# Patient Record
Sex: FEMALE | Race: OTHER | HISPANIC OR LATINO
[De-identification: names, ages, dates, MRNs, and addresses within clinical notes are randomized per-mention and may not be internally consistent; named-entity substitution may affect disease eponyms.]

---

## 2017-01-30 ENCOUNTER — RECORD ABSTRACTING (OUTPATIENT)
Age: 57
End: 2017-01-30

## 2017-01-30 DIAGNOSIS — Z87.19 PERSONAL HISTORY OF OTHER DISEASES OF THE DIGESTIVE SYSTEM: ICD-10-CM

## 2017-01-30 DIAGNOSIS — Z87.898 PERSONAL HISTORY OF OTHER SPECIFIED CONDITIONS: ICD-10-CM

## 2017-01-30 DIAGNOSIS — Z80.3 FAMILY HISTORY OF MALIGNANT NEOPLASM OF BREAST: ICD-10-CM

## 2017-01-30 DIAGNOSIS — Z86.010 PERSONAL HISTORY OF COLONIC POLYPS: ICD-10-CM

## 2017-01-30 DIAGNOSIS — Z78.9 OTHER SPECIFIED HEALTH STATUS: ICD-10-CM

## 2017-01-30 DIAGNOSIS — Z80.49 FAMILY HISTORY OF MALIGNANT NEOPLASM OF OTHER GENITAL ORGANS: ICD-10-CM

## 2017-01-30 DIAGNOSIS — Z86.39 PERSONAL HISTORY OF OTHER ENDOCRINE, NUTRITIONAL AND METABOLIC DISEASE: ICD-10-CM

## 2017-02-17 ENCOUNTER — OUTPATIENT (OUTPATIENT)
Dept: OUTPATIENT SERVICES | Facility: HOSPITAL | Age: 57
LOS: 1 days | Discharge: HOME | End: 2017-02-17

## 2017-03-09 ENCOUNTER — APPOINTMENT (OUTPATIENT)
Dept: PODIATRY | Facility: CLINIC | Age: 57
End: 2017-03-09

## 2017-05-01 ENCOUNTER — APPOINTMENT (OUTPATIENT)
Dept: OBGYN | Facility: CLINIC | Age: 57
End: 2017-05-01

## 2017-06-15 ENCOUNTER — APPOINTMENT (OUTPATIENT)
Dept: OBGYN | Facility: CLINIC | Age: 57
End: 2017-06-15

## 2017-06-27 DIAGNOSIS — Z00.00 ENCOUNTER FOR GENERAL ADULT MEDICAL EXAMINATION WITHOUT ABNORMAL FINDINGS: ICD-10-CM

## 2017-06-27 DIAGNOSIS — E78.00 PURE HYPERCHOLESTEROLEMIA, UNSPECIFIED: ICD-10-CM

## 2017-07-17 ENCOUNTER — APPOINTMENT (OUTPATIENT)
Dept: PODIATRY | Facility: CLINIC | Age: 57
End: 2017-07-17

## 2017-09-19 ENCOUNTER — APPOINTMENT (OUTPATIENT)
Dept: OBGYN | Facility: CLINIC | Age: 57
End: 2017-09-19

## 2018-01-30 ENCOUNTER — OUTPATIENT (OUTPATIENT)
Dept: OUTPATIENT SERVICES | Facility: HOSPITAL | Age: 58
LOS: 1 days | Discharge: HOME | End: 2018-01-30

## 2018-02-05 DIAGNOSIS — H51.11 CONVERGENCE INSUFFICIENCY: ICD-10-CM

## 2018-02-05 DIAGNOSIS — H25.13 AGE-RELATED NUCLEAR CATARACT, BILATERAL: ICD-10-CM

## 2018-02-05 DIAGNOSIS — H16.223 KERATOCONJUNCTIVITIS SICCA, NOT SPECIFIED AS SJOGREN'S, BILATERAL: ICD-10-CM

## 2018-02-05 DIAGNOSIS — H35.61 RETINAL HEMORRHAGE, RIGHT EYE: ICD-10-CM

## 2018-02-05 DIAGNOSIS — H35.013 CHANGES IN RETINAL VASCULAR APPEARANCE, BILATERAL: ICD-10-CM

## 2018-03-15 ENCOUNTER — APPOINTMENT (OUTPATIENT)
Dept: OBGYN | Facility: CLINIC | Age: 58
End: 2018-03-15

## 2018-03-15 ENCOUNTER — LABORATORY RESULT (OUTPATIENT)
Age: 58
End: 2018-03-15

## 2018-03-15 ENCOUNTER — OUTPATIENT (OUTPATIENT)
Dept: OUTPATIENT SERVICES | Facility: HOSPITAL | Age: 58
LOS: 1 days | Discharge: HOME | End: 2018-03-15

## 2018-03-15 VITALS — DIASTOLIC BLOOD PRESSURE: 80 MMHG | WEIGHT: 167 LBS | SYSTOLIC BLOOD PRESSURE: 110 MMHG

## 2018-03-15 DIAGNOSIS — Z80.0 FAMILY HISTORY OF MALIGNANT NEOPLASM OF DIGESTIVE ORGANS: ICD-10-CM

## 2018-03-15 RX ORDER — OMEPRAZOLE 20 MG/1
20 TABLET, DELAYED RELEASE ORAL
Refills: 0 | Status: ACTIVE | COMMUNITY

## 2018-03-15 RX ORDER — AMOXICILLIN 875 MG/1
875 TABLET, FILM COATED ORAL
Qty: 14 | Refills: 0 | Status: DISCONTINUED | COMMUNITY
Start: 2017-07-25

## 2018-03-15 RX ORDER — PAROXETINE HYDROCHLORIDE 20 MG/1
20 TABLET, FILM COATED ORAL
Qty: 30 | Refills: 0 | Status: ACTIVE | COMMUNITY
Start: 2017-07-14

## 2018-03-15 RX ORDER — OXYBUTYNIN CHLORIDE 10 MG/1
10 TABLET, EXTENDED RELEASE ORAL
Refills: 0 | Status: DISCONTINUED | COMMUNITY
End: 2018-03-15

## 2018-03-15 RX ORDER — ATORVASTATIN CALCIUM 80 MG/1
TABLET, FILM COATED ORAL
Refills: 0 | Status: ACTIVE | COMMUNITY

## 2018-03-16 DIAGNOSIS — Z01.419 ENCOUNTER FOR GYNECOLOGICAL EXAMINATION (GENERAL) (ROUTINE) WITHOUT ABNORMAL FINDINGS: ICD-10-CM

## 2018-04-12 ENCOUNTER — RESULT CHARGE (OUTPATIENT)
Age: 58
End: 2018-04-12

## 2018-04-12 ENCOUNTER — APPOINTMENT (OUTPATIENT)
Dept: OBGYN | Facility: CLINIC | Age: 58
End: 2018-04-12

## 2018-04-12 ENCOUNTER — OUTPATIENT (OUTPATIENT)
Dept: OUTPATIENT SERVICES | Facility: HOSPITAL | Age: 58
LOS: 1 days | Discharge: HOME | End: 2018-04-12

## 2018-04-12 ENCOUNTER — LABORATORY RESULT (OUTPATIENT)
Age: 58
End: 2018-04-12

## 2018-04-12 VITALS — DIASTOLIC BLOOD PRESSURE: 70 MMHG | SYSTOLIC BLOOD PRESSURE: 100 MMHG

## 2018-04-12 DIAGNOSIS — N87.9 DYSPLASIA OF CERVIX UTERI, UNSPECIFIED: ICD-10-CM

## 2018-04-12 LAB — HCG UR QL: NEGATIVE

## 2018-04-13 DIAGNOSIS — N87.9 DYSPLASIA OF CERVIX UTERI, UNSPECIFIED: ICD-10-CM

## 2018-04-24 ENCOUNTER — OUTPATIENT (OUTPATIENT)
Dept: OUTPATIENT SERVICES | Facility: HOSPITAL | Age: 58
LOS: 1 days | Discharge: HOME | End: 2018-04-24

## 2018-04-24 DIAGNOSIS — Z12.31 ENCOUNTER FOR SCREENING MAMMOGRAM FOR MALIGNANT NEOPLASM OF BREAST: ICD-10-CM

## 2018-05-03 ENCOUNTER — OUTPATIENT (OUTPATIENT)
Dept: OUTPATIENT SERVICES | Facility: HOSPITAL | Age: 58
LOS: 1 days | Discharge: HOME | End: 2018-05-03

## 2018-05-03 ENCOUNTER — APPOINTMENT (OUTPATIENT)
Dept: OBGYN | Facility: CLINIC | Age: 58
End: 2018-05-03

## 2018-05-03 VITALS
WEIGHT: 166.38 LBS | DIASTOLIC BLOOD PRESSURE: 70 MMHG | HEIGHT: 62 IN | BODY MASS INDEX: 30.62 KG/M2 | SYSTOLIC BLOOD PRESSURE: 110 MMHG

## 2018-05-03 DIAGNOSIS — L03.019 CELLULITIS OF UNSPECIFIED FINGER: ICD-10-CM

## 2018-05-03 DIAGNOSIS — R92.2 INCONCLUSIVE MAMMOGRAM: ICD-10-CM

## 2018-05-03 RX ORDER — MUPIROCIN 20 MG/G
2 OINTMENT TOPICAL 3 TIMES DAILY
Qty: 7 | Refills: 0 | Status: ACTIVE | COMMUNITY
Start: 2018-05-03 | End: 1900-01-01

## 2018-05-07 DIAGNOSIS — N87.9 DYSPLASIA OF CERVIX UTERI, UNSPECIFIED: ICD-10-CM

## 2018-08-22 ENCOUNTER — OUTPATIENT (OUTPATIENT)
Dept: OUTPATIENT SERVICES | Facility: HOSPITAL | Age: 58
LOS: 1 days | Discharge: HOME | End: 2018-08-22

## 2019-05-14 ENCOUNTER — OUTPATIENT (OUTPATIENT)
Dept: OUTPATIENT SERVICES | Facility: HOSPITAL | Age: 59
LOS: 1 days | Discharge: HOME | End: 2019-05-14

## 2019-05-14 ENCOUNTER — APPOINTMENT (OUTPATIENT)
Dept: OBGYN | Facility: CLINIC | Age: 59
End: 2019-05-14
Payer: MEDICAID

## 2019-05-14 VITALS — BODY MASS INDEX: 33.96 KG/M2 | SYSTOLIC BLOOD PRESSURE: 110 MMHG | WEIGHT: 171 LBS | DIASTOLIC BLOOD PRESSURE: 72 MMHG

## 2019-05-14 PROCEDURE — 99396 PREV VISIT EST AGE 40-64: CPT

## 2019-05-14 NOTE — PHYSICAL EXAM
[Awake] : awake [Acute Distress] : no acute distress [Alert] : alert [Thyroid Nodule] : no thyroid nodule [LAD] : no lymphadenopathy [Mass] : no breast mass [Goiter] : no goiter [Nipple Discharge] : no nipple discharge [Axillary LAD] : no axillary lymphadenopathy [Soft] : soft [Distended] : not distended [Tender] : non tender [No Lesions] : no genitalia lesions [Normal] : vagina [Motion Tenderness] : there was no cervical motion tenderness [Discharge] : no discharge [FreeTextEntry6] : 6 week size, anteverted uterus, no adnexal masses noted.

## 2019-05-14 NOTE — HISTORY OF PRESENT ILLNESS
[Last Pap ___] : Last cervical pap smear was [unfilled] [1 Year Ago] : 1 year ago [Postmenopausal] : is postmenopausal [Sexually Active] : is sexually active [Menopause Age: ____] : age at menopause was [unfilled]

## 2019-05-15 DIAGNOSIS — Z01.419 ENCOUNTER FOR GYNECOLOGICAL EXAMINATION (GENERAL) (ROUTINE) WITHOUT ABNORMAL FINDINGS: ICD-10-CM

## 2019-05-23 LAB — HPV HIGH+LOW RISK DNA PNL CVX: NOT DETECTED

## 2019-06-21 ENCOUNTER — OUTPATIENT (OUTPATIENT)
Dept: OUTPATIENT SERVICES | Facility: HOSPITAL | Age: 59
LOS: 1 days | Discharge: HOME | End: 2019-06-21
Payer: MEDICAID

## 2019-06-21 DIAGNOSIS — M79.672 PAIN IN LEFT FOOT: ICD-10-CM

## 2019-06-21 PROCEDURE — 73630 X-RAY EXAM OF FOOT: CPT | Mod: 26,LT

## 2019-06-27 ENCOUNTER — FORM ENCOUNTER (OUTPATIENT)
Age: 59
End: 2019-06-27

## 2019-06-28 ENCOUNTER — OUTPATIENT (OUTPATIENT)
Dept: OUTPATIENT SERVICES | Facility: HOSPITAL | Age: 59
LOS: 1 days | Discharge: HOME | End: 2019-06-28
Payer: MEDICAID

## 2019-06-28 DIAGNOSIS — Z12.31 ENCOUNTER FOR SCREENING MAMMOGRAM FOR MALIGNANT NEOPLASM OF BREAST: ICD-10-CM

## 2019-06-28 PROCEDURE — 77063 BREAST TOMOSYNTHESIS BI: CPT | Mod: 26

## 2019-06-28 PROCEDURE — 77067 SCR MAMMO BI INCL CAD: CPT | Mod: 26

## 2019-10-23 ENCOUNTER — OUTPATIENT (OUTPATIENT)
Dept: OUTPATIENT SERVICES | Facility: HOSPITAL | Age: 59
LOS: 1 days | Discharge: HOME | End: 2019-10-23
Payer: MEDICAID

## 2019-10-23 PROCEDURE — 92020 GONIOSCOPY: CPT

## 2019-10-23 PROCEDURE — 92012 INTRM OPH EXAM EST PATIENT: CPT

## 2019-10-24 DIAGNOSIS — H04.123 DRY EYE SYNDROME OF BILATERAL LACRIMAL GLANDS: ICD-10-CM

## 2019-10-24 DIAGNOSIS — H02.88A MEIBOMIAN GLAND DYSFUNCTION RIGHT EYE, UPPER AND LOWER EYELIDS: ICD-10-CM

## 2019-10-24 DIAGNOSIS — H40.033 ANATOMICAL NARROW ANGLE, BILATERAL: ICD-10-CM

## 2019-10-24 DIAGNOSIS — H35.013 CHANGES IN RETINAL VASCULAR APPEARANCE, BILATERAL: ICD-10-CM

## 2019-10-24 DIAGNOSIS — H02.88B MEIBOMIAN GLAND DYSFUNCTION LEFT EYE, UPPER AND LOWER EYELIDS: ICD-10-CM

## 2019-11-13 ENCOUNTER — OUTPATIENT (OUTPATIENT)
Dept: OUTPATIENT SERVICES | Facility: HOSPITAL | Age: 59
LOS: 1 days | Discharge: HOME | End: 2019-11-13

## 2019-11-14 DIAGNOSIS — Z79.899 OTHER LONG TERM (CURRENT) DRUG THERAPY: ICD-10-CM

## 2021-08-24 ENCOUNTER — APPOINTMENT (OUTPATIENT)
Dept: OBGYN | Facility: CLINIC | Age: 61
End: 2021-08-24
Payer: MEDICAID

## 2021-08-24 ENCOUNTER — OUTPATIENT (OUTPATIENT)
Dept: OUTPATIENT SERVICES | Facility: HOSPITAL | Age: 61
LOS: 1 days | Discharge: HOME | End: 2021-08-24

## 2021-08-24 VITALS
BODY MASS INDEX: 34.22 KG/M2 | WEIGHT: 172 LBS | DIASTOLIC BLOOD PRESSURE: 60 MMHG | SYSTOLIC BLOOD PRESSURE: 118 MMHG | HEIGHT: 59.5 IN

## 2021-08-24 PROCEDURE — 99396 PREV VISIT EST AGE 40-64: CPT

## 2021-11-04 ENCOUNTER — EMERGENCY (EMERGENCY)
Facility: HOSPITAL | Age: 61
LOS: 0 days | Discharge: HOME | End: 2021-11-04
Attending: EMERGENCY MEDICINE | Admitting: EMERGENCY MEDICINE
Payer: MEDICAID

## 2021-11-04 VITALS
RESPIRATION RATE: 18 BRPM | DIASTOLIC BLOOD PRESSURE: 92 MMHG | HEART RATE: 87 BPM | SYSTOLIC BLOOD PRESSURE: 142 MMHG | OXYGEN SATURATION: 99 % | WEIGHT: 171.08 LBS | TEMPERATURE: 98 F

## 2021-11-04 DIAGNOSIS — R51.9 HEADACHE, UNSPECIFIED: ICD-10-CM

## 2021-11-04 DIAGNOSIS — E78.5 HYPERLIPIDEMIA, UNSPECIFIED: ICD-10-CM

## 2021-11-04 DIAGNOSIS — S01.112A LACERATION WITHOUT FOREIGN BODY OF LEFT EYELID AND PERIOCULAR AREA, INITIAL ENCOUNTER: ICD-10-CM
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
## 2021-11-04 DIAGNOSIS — Z88.7 ALLERGY STATUS TO SERUM AND VACCINE: ICD-10-CM

## 2021-11-04 DIAGNOSIS — S09.90XA UNSPECIFIED INJURY OF HEAD, INITIAL ENCOUNTER: ICD-10-CM

## 2021-11-04 PROCEDURE — 70450 CT HEAD/BRAIN W/O DYE: CPT | Mod: 26,MA

## 2021-11-04 PROCEDURE — 12011 RPR F/E/E/N/L/M 2.5 CM/<: CPT

## 2021-11-04 PROCEDURE — 99284 EMERGENCY DEPT VISIT MOD MDM: CPT | Mod: 25

## 2021-11-04 NOTE — ED PROVIDER NOTE - OBJECTIVE STATEMENT
61yF no stated pmhx c/o headache starting approx 1hr PTA after fall; constant, stable; associated w/ laceration to LT eyebrow; states she was walking on stairs, it was dark and she tripped and fell hitting head on hand rail; no LOC, does not take AC; does not complain of pain anywhere else. Otherwise in her usual state of health.  : Libby [id number]

## 2021-11-04 NOTE — ED PROVIDER NOTE - NS ED ROS FT
Review of Systems:  	•	CONSTITUTIONAL - no fever, no diaphoresis  	•	SKIN - no rash, +laceration  	•	HEMATOLOGIC - no bleeding, no bruising  	•	EYES - no discharge, no injection  	•	ENT - no sore throat, no runny nose  	•	RESPIRATORY - no shortness of breath, no cough  	•	CARDIAC - no chest pain, no palpitations  	•	GI - no abd pain, no nausea, no vomiting, no diarrhea  	•	GENITO-URINARY - no dysuria, no hematuria  	•	MUSCULOSKELETAL - no joint pain, no muscle aches  	•	NEUROLOGIC - no dizziness, +headache

## 2021-11-04 NOTE — ED ADULT TRIAGE NOTE - CHIEF COMPLAINT QUOTE
Patient states she had mechanical fall down 2 steps hitting left side of head on hand rail. Denies head traum and LOC and blood thinners

## 2021-11-04 NOTE — ED PROVIDER NOTE - PATIENT PORTAL LINK FT
You can access the FollowMyHealth Patient Portal offered by [facility] by registering at the following website: [email or web address]. By joining [employer]’s FollowMyHealth portal, you will also be able to view your health information using other applications (apps) compatible with our system.

## 2021-11-04 NOTE — ED PROVIDER NOTE - NSFOLLOWUPINSTRUCTIONS_ED_ALL_ED_FT
Regresa a la robert de emergencia en anselmo hasnen para quitar las puntadas. Regresa mas temprano por otras symptomas.       Facial Laceration    A facial laceration is a cut (laceration) on the face. You can get a facial laceration from any accident or injury that cuts or tears the skin or tissues on your face. Facial lacerations can bleed and be painful. You may need medical attention to stop the bleeding, help the wound heal, lower your risk for infection, and prevent scarring. Lacerations usually heal quickly after treatment.  What are the causes?  Facial lacerations are often caused by:   A motor vehicle accident.  A sports injury.  A violent attack.  A fall.  What are the signs or symptoms?  Common symptoms of this condition include:   An obvious cut on the face.  Bleeding.  Pain.  Swelling.  Bruising.  A change in the appearance of the face (deformity).  How is this diagnosed?  Your health care provider can diagnose a facial laceration by doing a physical exam and asking how the injury happened. Your provider will also check for areas of bleeding, tissue damage, nerve injury, and a foreign body in your wound.  How is this treated?  Treatment for a facial laceration depends on how severe and deep the wound is. It also depends on the risk for infection. First, your health care provider will clean the wound to prevent infection. Then, your health care provider will decide whether to close the wound. This depends on how deep the laceration is and how long ago your injury happened. If there is an increased risk of infection, the wound will not be closed.   If your wound needs to be closed:   Your health care provider will use stitches (sutures), skin glue (skin adhesive), or skin adhesive strips to repair the laceration.  Your health care provider may first numb the area around your wound by injecting a numbing medicine (local anesthetic) in and around your laceration before doing the sutures.  Torn skin edges or dead skin may be removed.  If sutures are used, the laceration may be closed in layers. Absorbable sutures will be used for deep tissues and muscle. Removable sutures will be used to close the skin.  You may be given:   Pain medicine.  A tetanus shot.  Oral antibiotic medicines.  Antibiotic ointment.  Follow these instructions at home:  Wound care   Follow your health care provider’s instructions for wound care. These instructions will vary depending on how the wound was closed.  For sutures:   Keep the wound clean and dry.  If you were given a bandage (dressing), change it at least once a day, or as told by your health care provider. Also change the dressing if it gets wet or dirty.  Wash the wound with soap and water two times a day, or as told by your health care provider. Rinse off the soap with water. Pat the wound dry with a clean towel.  After cleaning, apply a thin layer of antibiotic ointment as told by your health care provider. This helps prevent infection and keeps the dressing from sticking to the wound.  You may shower as usual after the first 24 hours. Do not soak the wound until the sutures are removed.  Return to have you sutures removed as told by your health care provider.  Do not wear makeup until your health care provider has approved.  For skin adhesive:   You may briefly wet your wound in the shower or bath.  Do not soak or scrub the wound.  Do not swim.  Do not sweat heavily until the skin adhesive has fallen off on its own.  After showering or bathing, gently pat the wound dry with a clean towel.  Do not apply liquid medicine, cream medicine, ointment, or makeup to your wound while the skin adhesive is in place. This may loosen the film before your wound is healed.  If you have a dressing over your wound, be careful not to apply tape directly over the skin adhesive. This may pull off the adhesive before the wound is healed.  Do not spend a long time in the sun or use a tanning lamp while the skin adhesive is in place.  The skin adhesive will usually remain in place for 5–10 days and then naturally fall off the skin. Do not pick at the adhesive film.  For skin adhesive strips:   Keep the wound clean and dry.  Do not let the skin adhesive strips get wet.  Bathe carefully to keep the wound and adhesive strips dry. If the wound gets wet, pat it dry with a clean towel right away.  Skin adhesive strips fall off on their own over time. You may trim the strips as the wound heals. Do not remove skin adhesive strips that are still stuck to the wound.  General instructions      Check your wound area every day for signs of infection. Check for:   Redness, swelling, or pain.  Fluid or blood.  Warmth.  Pus or a bad smell.  Take over-the-counter and prescription medicines only as told by your health care provider.  If you were prescribed an antibiotic, take or apply it as told by your health care provider. Do not stop using the antibiotic even if your condition improves.  After the laceration has healed:   Know that it can take a year or two for redness or scarring to fade.  Apply sunscreen to the skin of your healed wound to minimize scarring. Ultraviolet (UV) rays can darken scar tissue.  Contact a health care provider if:  You have a fever.  You have redness, swelling, or pain around your wound.  You have fluid or blood coming from your wound.  Your wound feels warm to the touch.  You have pus or a bad smell coming from your wound.  Get help right away if:  You have a red streak going away from your wound.  Summary  You may need treatment for a facial laceration to prevent infection, stop bleeding, help healing, and prevent scarring.  A deep laceration may be closed with stitches (sutures).  Follow your health care provider's wound care instructions carefully.  This information is not intended to replace advice given to you by your health care provider. Make sure you discuss any questions you have with your health care provider.

## 2021-11-04 NOTE — ED PROVIDER NOTE - CARE PROVIDER_API CALL
ANGELITO AMADOR  Internal Medicine  [address] [place] [address]  Phone: [phone]  Fax: [phone]  Established Patient  Follow Up Time: 1-3 Days

## 2021-11-04 NOTE — ED PROVIDER NOTE - PHYSICAL EXAMINATION
Vital Signs: Reviewed  GEN: alert, NAD, speaks full sentences  HEAD:  normocephalic, approx 0.5cm linear laceration to LT eyebrow, minimal oozing bleeding  EYES:  PERRLA; conjunctivae without injection, drainage or discharge  ENMT:  nasal mucosa moist; mouth moist without ulcerations or lesions; throat moist without erythema, exudate, ulcerations or lesions  NECK:  supple, no midline tenderness, FROM  CARDIAC:  regular rate, normal S1 and S2, no murmurs  RESP:  respiratory rate and effort appear normal for age; lungs are clear to auscultation bilaterally; no rales or wheezes  ABDOMEN:  soft, nontender, nondistended  MUSCULOSKELETAL/NEURO: no midline spinal tenderness, no chest wall tenderness, pelvis stable, FROM all 4 extremities; normal movement, normal tone  SKIN:  normal skin color for age and race, well-perfused; warm and dry

## 2021-11-04 NOTE — ED PROVIDER NOTE - CLINICAL SUMMARY MEDICAL DECISION MAKING FREE TEXT BOX
62 yo female PMH HLD presenting with closed head injury and a laceration after a mechanical fall.  She tripped on a staircase and  hit the left side of her face on a hand rail PTA.  Denies any LOC, no blood thinner use, no blurry vision, neck pain , CP, SOB, dizziness /lightheadedness, no focal weakness or paresthesias.  Well-appearing female in NAD, PERRL, 1 cm laceration of the lateral aspect of the left eyebrow, no midline spine ttp, nml work of breathing, no focal neuro deficits. Laceration was repaired, wound care discussed, strict return precautions given.  Patient refused tetanus shot, stating she is allergic to it ( as per Dr Sutherland).

## 2021-11-09 ENCOUNTER — EMERGENCY (EMERGENCY)
Facility: HOSPITAL | Age: 61
LOS: 0 days | Discharge: HOME | End: 2021-11-09
Attending: EMERGENCY MEDICINE | Admitting: EMERGENCY MEDICINE
Payer: MEDICAID

## 2021-11-09 VITALS
DIASTOLIC BLOOD PRESSURE: 74 MMHG | RESPIRATION RATE: 18 BRPM | HEART RATE: 78 BPM | TEMPERATURE: 98 F | OXYGEN SATURATION: 98 % | SYSTOLIC BLOOD PRESSURE: 137 MMHG
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
## 2021-11-09 DIAGNOSIS — Z48.02 ENCOUNTER FOR REMOVAL OF SUTURES: ICD-10-CM
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
## 2021-11-09 DIAGNOSIS — S01.112D LACERATION WITHOUT FOREIGN BODY OF LEFT EYELID AND PERIOCULAR AREA, SUBSEQUENT ENCOUNTER: ICD-10-CM

## 2021-11-09 PROBLEM — Z78.9 OTHER SPECIFIED HEALTH STATUS: Chronic | Status: ACTIVE | Noted: 2021-11-04

## 2021-11-09 PROCEDURE — L9995: CPT

## 2021-11-09 NOTE — ED PROVIDER NOTE - PHYSICAL EXAMINATION
CONSTITUTIONAL: Well-developed; well-nourished; in no acute distress.   SKIN: warm, dry  HEAD: Normocephalic; atraumatic.  EYES: L eyebrow laceration - no drainage or redness at the site   ENT: No nasal discharge; airway clear.  NECK: Supple; non tender.  CARD:  Regular rate and rhythm.   RESP: NO inc WOB , speaking in full sentences, lungs CTAB  ABD: soft ntnd  EXT: Normal ROM.  no LE edema no unilateral leg swelling  NEURO: Alert, oriented, grossly unremarkable  PSYCH: Cooperative, appropriate.

## 2021-11-09 NOTE — ED PROVIDER NOTE - OBJECTIVE STATEMENT
Pt is a 62 yo F w/ no sig pmhx presenting for removal of 1 suture. ~ 5 days ago she was here for fail down stairs, sustained a laceration to the R eyebrow. pt denies any redness or drainage at the site

## 2021-11-09 NOTE — ED PROVIDER NOTE - PATIENT PORTAL LINK FT
You can access the FollowMyHealth Patient Portal offered by [facility] by registering at the following website: [email or web address]. By joining [employer]’s FollowMyHealth portal, you will also be able to view your health information using other applications (apps) compatible with our system.

## 2021-11-09 NOTE — ED PROVIDER NOTE - ATTENDING CONTRIBUTION TO CARE
62 y/o female in ER for suture removal.  Cut to L eyebrow 5 days ago s/p fall.  no other injuries.  1 suture placed in ER.  no redness/swelling/drainage/fever.  no ha/n/v/dizziness/syncope/near syncope.  PE - nad, L eyebrow with healed lateral eyebrow lac, c/d/i, no erythema/swelling/drainage.  -suture removal.

## 2022-01-26 ENCOUNTER — APPOINTMENT (OUTPATIENT)
Dept: OPHTHALMOLOGY | Facility: CLINIC | Age: 62
End: 2022-01-26

## 2022-01-26 ENCOUNTER — RESULT REVIEW (OUTPATIENT)
Age: 62
End: 2022-01-26

## 2022-01-26 ENCOUNTER — OUTPATIENT (OUTPATIENT)
Dept: OUTPATIENT SERVICES | Facility: HOSPITAL | Age: 62
LOS: 1 days | Discharge: HOME | End: 2022-01-26
Payer: MEDICAID

## 2022-01-26 DIAGNOSIS — Z12.31 ENCOUNTER FOR SCREENING MAMMOGRAM FOR MALIGNANT NEOPLASM OF BREAST: ICD-10-CM

## 2022-01-26 PROCEDURE — 77063 BREAST TOMOSYNTHESIS BI: CPT | Mod: 26

## 2022-01-26 PROCEDURE — 77067 SCR MAMMO BI INCL CAD: CPT | Mod: 26

## 2022-03-10 ENCOUNTER — APPOINTMENT (OUTPATIENT)
Dept: OTOLARYNGOLOGY | Facility: CLINIC | Age: 62
End: 2022-03-10

## 2022-04-04 ENCOUNTER — APPOINTMENT (OUTPATIENT)
Dept: OPHTHALMOLOGY | Facility: CLINIC | Age: 62
End: 2022-04-04

## 2022-07-05 ENCOUNTER — APPOINTMENT (OUTPATIENT)
Dept: OPHTHALMOLOGY | Facility: CLINIC | Age: 62
End: 2022-07-05

## 2022-07-05 ENCOUNTER — OUTPATIENT (OUTPATIENT)
Dept: OUTPATIENT SERVICES | Facility: HOSPITAL | Age: 62
LOS: 1 days | Discharge: HOME | End: 2022-07-05

## 2022-07-05 PROCEDURE — 92133 CPTRZD OPH DX IMG PST SGM ON: CPT | Mod: 26

## 2022-07-05 PROCEDURE — 92020 GONIOSCOPY: CPT

## 2022-07-05 PROCEDURE — 92014 COMPRE OPH EXAM EST PT 1/>: CPT

## 2022-07-18 DIAGNOSIS — H40.013 OPEN ANGLE WITH BORDERLINE FINDINGS, LOW RISK, BILATERAL: ICD-10-CM

## 2022-07-18 DIAGNOSIS — H04.123 DRY EYE SYNDROME OF BILATERAL LACRIMAL GLANDS: ICD-10-CM

## 2022-07-18 DIAGNOSIS — H35.013 CHANGES IN RETINAL VASCULAR APPEARANCE, BILATERAL: ICD-10-CM

## 2022-08-26 ENCOUNTER — APPOINTMENT (OUTPATIENT)
Dept: OBGYN | Facility: CLINIC | Age: 62
End: 2022-08-26

## 2023-01-10 ENCOUNTER — OUTPATIENT (OUTPATIENT)
Dept: OUTPATIENT SERVICES | Facility: HOSPITAL | Age: 63
LOS: 1 days | Discharge: HOME | End: 2023-01-10

## 2023-01-10 ENCOUNTER — APPOINTMENT (OUTPATIENT)
Dept: OPHTHALMOLOGY | Facility: CLINIC | Age: 63
End: 2023-01-10
Payer: MEDICARE

## 2023-01-10 PROCEDURE — 92012 INTRM OPH EXAM EST PATIENT: CPT

## 2023-04-17 ENCOUNTER — APPOINTMENT (OUTPATIENT)
Dept: PODIATRY | Facility: CLINIC | Age: 63
End: 2023-04-17
Payer: MEDICARE

## 2023-04-17 ENCOUNTER — OUTPATIENT (OUTPATIENT)
Dept: OUTPATIENT SERVICES | Facility: HOSPITAL | Age: 63
LOS: 1 days | End: 2023-04-17
Payer: MEDICARE

## 2023-04-17 VITALS
BODY MASS INDEX: 35.21 KG/M2 | DIASTOLIC BLOOD PRESSURE: 90 MMHG | HEART RATE: 68 BPM | WEIGHT: 177 LBS | HEIGHT: 59.5 IN | TEMPERATURE: 98.4 F | SYSTOLIC BLOOD PRESSURE: 134 MMHG

## 2023-04-17 DIAGNOSIS — M25.572 PAIN IN LEFT ANKLE AND JOINTS OF LEFT FOOT: ICD-10-CM

## 2023-04-17 DIAGNOSIS — M25.569 PAIN IN UNSPECIFIED KNEE: ICD-10-CM

## 2023-04-17 DIAGNOSIS — Z00.00 ENCOUNTER FOR GENERAL ADULT MEDICAL EXAMINATION WITHOUT ABNORMAL FINDINGS: ICD-10-CM

## 2023-04-17 PROCEDURE — 99203 OFFICE O/P NEW LOW 30 MIN: CPT | Mod: 25

## 2023-04-17 PROCEDURE — 20605 DRAIN/INJ JOINT/BURSA W/O US: CPT

## 2023-04-17 PROCEDURE — 73610 X-RAY EXAM OF ANKLE: CPT | Mod: 26,LT

## 2023-04-17 PROCEDURE — 73610 X-RAY EXAM OF ANKLE: CPT | Mod: LT

## 2023-04-17 PROCEDURE — 20605 DRAIN/INJ JOINT/BURSA W/O US: CPT | Mod: LT

## 2023-04-18 DIAGNOSIS — M25.572 PAIN IN LEFT ANKLE AND JOINTS OF LEFT FOOT: ICD-10-CM

## 2023-04-18 NOTE — HISTORY OF PRESENT ILLNESS
[FreeTextEntry1] : 63 yo female presents with left ankle and knee pain. Notes left ankle pain started 2 weeks. Denies any trauma. Notes prior h/o of ankle pain alleviated with steroid injection

## 2023-04-18 NOTE — ASSESSMENT
[FreeTextEntry1] : -referred for left ankle xrays\par -rest, ice, nsaids prn\par -left ankle injection\par -referred to ortho for left knee pain\par -return 2 weeks

## 2023-04-18 NOTE — PHYSICAL EXAM
[General Appearance - Alert] : alert [General Appearance - In No Acute Distress] : in no acute distress [Varicose Veins Of Lower Extremities] : present [] : normal strength/tone [de-identified] : TTP of lateral ankle gutter left ankle, noted  ligamentous laxity left ankle inversion.

## 2023-04-18 NOTE — PROCEDURE
[Ankle Injection] : ankle injection [Left Foot] : on the left foot [Therapeutic] : therapeutic [Patient] : the patient [Risks] : risks [Ethyl Chloride] : ethyl chloride [1%] : 1%  [Without Epi] : without epinephrine [Alcohol] : alcohol [25 gauge] : A 25 gauge needle was used [Betamethasone] : Betamethasone [de-identified] : 6mg

## 2023-04-19 DIAGNOSIS — M25.569 PAIN IN UNSPECIFIED KNEE: ICD-10-CM

## 2023-04-19 DIAGNOSIS — M19.072 PRIMARY OSTEOARTHRITIS, LEFT ANKLE AND FOOT: ICD-10-CM

## 2023-04-19 DIAGNOSIS — M25.572 PAIN IN LEFT ANKLE AND JOINTS OF LEFT FOOT: ICD-10-CM

## 2023-05-01 ENCOUNTER — OUTPATIENT (OUTPATIENT)
Dept: OUTPATIENT SERVICES | Facility: HOSPITAL | Age: 63
LOS: 1 days | End: 2023-05-01
Payer: MEDICARE

## 2023-05-01 ENCOUNTER — APPOINTMENT (OUTPATIENT)
Dept: PODIATRY | Facility: CLINIC | Age: 63
End: 2023-05-01
Payer: MEDICARE

## 2023-05-01 DIAGNOSIS — M19.072 PRIMARY OSTEOARTHRITIS, LEFT ANKLE AND FOOT: ICD-10-CM

## 2023-05-01 DIAGNOSIS — Z00.00 ENCOUNTER FOR GENERAL ADULT MEDICAL EXAMINATION WITHOUT ABNORMAL FINDINGS: ICD-10-CM

## 2023-05-01 DIAGNOSIS — M25.572 PAIN IN LEFT ANKLE AND JOINTS OF LEFT FOOT: ICD-10-CM

## 2023-05-01 DIAGNOSIS — M79.672 PAIN IN LEFT FOOT: ICD-10-CM

## 2023-05-01 DIAGNOSIS — M20.42 OTHER HAMMER TOE(S) (ACQUIRED), LEFT FOOT: ICD-10-CM

## 2023-05-01 PROCEDURE — 99214 OFFICE O/P EST MOD 30 MIN: CPT | Mod: 25,95

## 2023-05-01 PROCEDURE — 20605 DRAIN/INJ JOINT/BURSA W/O US: CPT | Mod: LT

## 2023-05-01 PROCEDURE — 99214 OFFICE O/P EST MOD 30 MIN: CPT | Mod: 25

## 2023-05-03 PROBLEM — M20.42 HAMMER TOE OF LEFT FOOT: Status: ACTIVE | Noted: 2023-05-03

## 2023-05-03 PROBLEM — M19.072 ARTHRITIS OF ANKLE, LEFT: Status: ACTIVE | Noted: 2023-04-18

## 2023-05-03 PROBLEM — M79.672 ACUTE FOOT PAIN, LEFT: Status: ACTIVE | Noted: 2023-05-03

## 2023-05-03 PROBLEM — M25.572 ACUTE LEFT ANKLE PAIN: Status: ACTIVE | Noted: 2023-04-17

## 2023-05-03 NOTE — HISTORY OF PRESENT ILLNESS
[FreeTextEntry1] : CC: "I am here to follow up with my left ankle pain, and my left 2nd toe hurts too"\par HPI:\par Mrs. Farahn King is a 62 year old female pt who presents for left ankle and left 2nd toe pain; on last visit, she mentioned about \par left ankle pain (7/10) and got an injection on left ankle, which pain has decreased to 4/10 as of today. She wants another injection\par on her left ankle today. Xray was taken on left ankle.\par As of today, she newly complaints about contracted left 2nd toe, wants to be evaluated. Eval BL feet;

## 2023-05-03 NOTE — ASSESSMENT
[FreeTextEntry1] : Assessment: Left ankle effusion / arthritis pain; Left 2nd hammer toe\par Plan:\par Pt seen and eval.\par Pt's complete biomechanical ambulation study was done / see biomechanic sheet.\par Injection on Left ankle to alleviate pt's L ankle arthritis / effusion pain.\par Discussed w/ pt regarding L 2nd hammer toe surgery; all possible complications / benefit / risk / plans were discussed. Pt agrees for sx. Discussed regarding conservative therapy, pt wants permanent fix and wants to get sx done. \par Pt will be scheduled for sx and will be notify to follow up 3 weeks prior to sx, to get preop testing done and take a R hallux nail sample to be sent for possible fungal nail. \par Pt will follow up as it will be scheduled for sx for L 2nd hammer toe.\par Will follow.

## 2023-05-03 NOTE — END OF VISIT
[] : Resident [Resident] : Resident [FreeTextEntry3] : left 2nd hammer toe. flexion contraction at the PIPJ and extension contraction at the MPJ\par Discussed risks including pain, swelling, delayed wound healing wound, dehiscence, gangrene, return of deformity, floating toe. . Questions sought and answered. Patient expresses understanding \par prior xrays reviewed\par Will schedule for left 2nd hammer toe correction possible weil osteotomy  [FreeTextEntry2] : left ankle injection given

## 2023-05-03 NOTE — PHYSICAL EXAM
[General Appearance - Alert] : alert [General Appearance - In No Acute Distress] : in no acute distress [General Appearance - Well Nourished] : well nourished [General Appearance - Well Developed] : well developed [General Appearance - Well-Appearing] : healthy appearing [] : normal voice and communication [Sensation] : the sensory exam was normal to light touch and pinprick [No Focal Deficits] : no focal deficits [de-identified] : Left ankle pain improving 7/10 -> 4/10\par Left 2nd contracted IPJ w/ pain on dorsum of IPJ.  [FreeTextEntry1] : No ulcer was noted on left foot and ankle. \par Stable L 2nd hammer toe dorsum IPJ, no wound. \par Mild edema noted on L ankle. \par Thickened nail w/ crumble quality on R hallux nail.

## 2023-05-03 NOTE — PROCEDURE
[Ankle Injection] : ankle injection [Left Foot] : on the left foot [Therapeutic] : therapeutic [Patient] : the patient [Risks] : risks [Ethyl Chloride] : ethyl chloride [Without Epi] : without epinephrine [25 gauge] : A 25 gauge needle was used [Betamethasone] : Betamethasone [Tolerated Well] : tolerated the procedure well [de-identified] : 6mg

## 2023-05-12 DIAGNOSIS — M25.572 PAIN IN LEFT ANKLE AND JOINTS OF LEFT FOOT: ICD-10-CM

## 2023-05-12 DIAGNOSIS — M79.672 PAIN IN LEFT FOOT: ICD-10-CM

## 2023-05-12 DIAGNOSIS — M19.072 PRIMARY OSTEOARTHRITIS, LEFT ANKLE AND FOOT: ICD-10-CM

## 2023-05-12 DIAGNOSIS — M20.42 OTHER HAMMER TOE(S) (ACQUIRED), LEFT FOOT: ICD-10-CM

## 2023-05-17 ENCOUNTER — NON-APPOINTMENT (OUTPATIENT)
Age: 63
End: 2023-05-17

## 2023-07-18 ENCOUNTER — APPOINTMENT (OUTPATIENT)
Dept: OPHTHALMOLOGY | Facility: CLINIC | Age: 63
End: 2023-07-18

## 2023-08-12 ENCOUNTER — EMERGENCY (EMERGENCY)
Facility: HOSPITAL | Age: 63
LOS: 0 days | Discharge: HOME | End: 2023-08-12
Attending: EMERGENCY MEDICINE
Payer: MEDICARE

## 2023-08-12 VITALS
RESPIRATION RATE: 17 BRPM | DIASTOLIC BLOOD PRESSURE: 86 MMHG | HEIGHT: 61 IN | OXYGEN SATURATION: 98 % | SYSTOLIC BLOOD PRESSURE: 147 MMHG | HEART RATE: 84 BPM | TEMPERATURE: 98 F | WEIGHT: 169.09 LBS

## 2023-08-12 DIAGNOSIS — M79.89 OTHER SPECIFIED SOFT TISSUE DISORDERS: ICD-10-CM

## 2023-08-12 DIAGNOSIS — Z88.7 ALLERGY STATUS TO SERUM AND VACCINE: ICD-10-CM

## 2023-08-12 DIAGNOSIS — F32.A DEPRESSION, UNSPECIFIED: ICD-10-CM

## 2023-08-12 DIAGNOSIS — L03.011 CELLULITIS OF RIGHT FINGER: ICD-10-CM

## 2023-08-12 DIAGNOSIS — I10 ESSENTIAL (PRIMARY) HYPERTENSION: ICD-10-CM

## 2023-08-12 PROCEDURE — 99283 EMERGENCY DEPT VISIT LOW MDM: CPT | Mod: 25

## 2023-08-12 PROCEDURE — 10060 I&D ABSCESS SIMPLE/SINGLE: CPT | Mod: F5

## 2023-08-12 PROCEDURE — 10060 I&D ABSCESS SIMPLE/SINGLE: CPT

## 2023-08-12 RX ORDER — CEPHALEXIN 500 MG
1 CAPSULE ORAL
Qty: 20 | Refills: 0
Start: 2023-08-12 | End: 2023-08-16

## 2023-08-12 NOTE — ED PROVIDER NOTE - ATTENDING APP SHARED VISIT CONTRIBUTION OF CARE
61 yo f with pmh of depression and htn presents with R thumb pain and swelling x 1 week.  denies trauma.  exam: R thumb with medial swelling, erythema, fluctuance at the cuticle imp: pt with paronychia, i&d and abx.

## 2023-08-12 NOTE — ED PROVIDER NOTE - NSFOLLOWUPCLINICS_GEN_ALL_ED_FT
[facility]  OP [facility]  [address] [place] [id number]  Phone: [phone]  Fax:

## 2023-08-12 NOTE — ED PROVIDER NOTE - NS ED ATTENDING STATEMENT MOD
This was a shared visit with the NASIM. I reviewed and verified the documentation and independently performed the documented:

## 2023-08-12 NOTE — ED PROVIDER NOTE - PHYSICAL EXAMINATION
VITAL SIGNS: I have reviewed nursing notes and confirm.  CONSTITUTIONAL:  in no acute distress.  SKIN: Skin exam is warm and dry, no acute rash. swelling and erythema to right thumb lateral aspect w/ fluctuance   HEAD: Normocephalic; atraumatic.  CARD: S1, S2 normal; no murmurs, gallops, or rubs. Regular rate and rhythm.  RESP: No wheezes, rales or rhonchi. Speaking in full sentences.   ABD: soft; non-distended; non-tender; No rebound or guarding.   EXT: Normal ROM.  No clubbing, cyanosis or edema.  NEURO: Alert, oriented. Grossly unremarkable. No focal deficits.

## 2023-08-12 NOTE — ED PROVIDER NOTE - NSFOLLOWUPINSTRUCTIONS_ED_ALL_ED_FT
Sandor un seguimiento con la clínica de petar mental y el médico de atención primaria en 1 a 3 días.    ******* Nuestros coordinadores de remisiones del [facility] se comunicarán con usted en las próximas 24 a 48 horas de 9:00 a. m. a 5:00 p. m. con travis saundra de seguimiento. Espere travis llamada telefónica del hospital en leida período de tiempo. Si no recibe travis llamada o si tiene alguna pregunta o inquietud, puede comunicarse con charanjit payton [phone]    Wound Care    Taking care of your wound properly can help to prevent pain and infection. It can also help your wound to heal more quickly.     HOW TO CARE FOR YOUR WOUND   Take or apply over-the-counter and prescription medicines only as told by your health care provider.  If you were prescribed antibiotic medicine, take or apply it as told by your health care provider. Do not stop using the antibiotic even if your condition improves.  Clean the wound each day or as told by your health care provider.  Wash the wound with mild soap and water.  Rinse the wound with water to remove all soap.  Pat the wound dry with a clean towel. Do not rub it.  There are many different ways to close and cover a wound. For example, a wound can be covered with stitches (sutures), skin glue, or adhesive strips. Follow instructions from your health care provider about:  How to take care of your wound.  When and how you should change your bandage (dressing).  When you should remove your dressing.  Removing whatever was used to close your wound.  Check your wound every day for signs of infection. Watch for:  Redness, swelling, or pain.  Fluid, blood, or pus.  Keep the dressing dry until your health care provider says it can be removed. Do not take baths, swim, use a hot tub, or do anything that would put your wound underwater until your health care provider approves.  Raise (elevate) the injured area above the level of your heart while you are sitting or lying down.  Do not scratch or pick at the wound.  Keep all follow-up visits as told by your health care provider. This is important.    SEEK MEDICAL CARE IF:  You received a tetanus shot and you have swelling, severe pain, redness, or bleeding at the injection site.  You have a fever.  Your pain is not controlled with medicine.  You have increased redness, swelling, or pain at the site of your wound.  You have fluid, blood, or pus coming from your wound.  You notice a bad smell coming from your wound or your dressing.    SEEK IMMEDIATE MEDICAL CARE IF:  You have a red streak going away from your wound.

## 2023-08-12 NOTE — ED PROVIDER NOTE - PATIENT PORTAL LINK FT
You can access the FollowMyHealth Patient Portal offered by [facility] by registering at the following website: [email or web address]. By joining [employer]’s FollowMyHealth portal, you will also be able to view your health information using other applications (apps) compatible with our system.

## 2023-08-12 NOTE — ED PROVIDER NOTE - CLINICAL SUMMARY MEDICAL DECISION MAKING FREE TEXT BOX
pt with paronychia, i&d and abx.  Pt was given strict return to ED precautions and pt indicated that he/she understood.

## 2023-08-18 ENCOUNTER — OUTPATIENT (OUTPATIENT)
Dept: OUTPATIENT SERVICES | Facility: HOSPITAL | Age: 63
LOS: 1 days | End: 2023-08-18
Payer: MEDICARE

## 2023-08-18 ENCOUNTER — APPOINTMENT (OUTPATIENT)
Dept: OBGYN | Facility: CLINIC | Age: 63
End: 2023-08-18
Payer: MEDICARE

## 2023-08-18 VITALS
BODY MASS INDEX: 32.43 KG/M2 | WEIGHT: 163 LBS | HEIGHT: 59.5 IN | DIASTOLIC BLOOD PRESSURE: 73 MMHG | HEART RATE: 70 BPM | SYSTOLIC BLOOD PRESSURE: 118 MMHG

## 2023-08-18 DIAGNOSIS — Z01.419 ENCOUNTER FOR GYNECOLOGICAL EXAMINATION (GENERAL) (ROUTINE) WITHOUT ABNORMAL FINDINGS: ICD-10-CM

## 2023-08-18 PROCEDURE — 87086 URINE CULTURE/COLONY COUNT: CPT

## 2023-08-18 PROCEDURE — 87624 HPV HI-RISK TYP POOLED RSLT: CPT

## 2023-08-18 PROCEDURE — 88142 CYTOPATH C/V THIN LAYER: CPT

## 2023-08-18 PROCEDURE — 99396 PREV VISIT EST AGE 40-64: CPT

## 2023-08-18 NOTE — HISTORY OF PRESENT ILLNESS
[Last Pap ___] : Last cervical pap smear was [unfilled] [Postmenopausal] : is postmenopausal [Menopause Age: ____] : age at menopause was [unfilled] [Sexually Active] : is sexually active [___ Year(s) Ago] : [unfilled] year(s) ago [Last Mammogram ___] : Last Mammogram was [unfilled]

## 2023-08-18 NOTE — PHYSICAL EXAM
[Awake] : awake [Alert] : alert [Soft] : soft [No Lesions] : no genitalia lesions [Normal] : cervix [Acute Distress] : no acute distress [LAD] : no lymphadenopathy [Thyroid Nodule] : no thyroid nodule [Goiter] : no goiter [Mass] : no breast mass [Nipple Discharge] : no nipple discharge [Axillary LAD] : no axillary lymphadenopathy [Tender] : non tender [Distended] : not distended [Discharge] : no discharge [Motion Tenderness] : there was no cervical motion tenderness [FreeTextEntry6] : 6 week size, anteverted uterus, no adnexal masses noted.

## 2023-08-20 LAB — BACTERIA UR CULT: NORMAL

## 2023-08-21 ENCOUNTER — OUTPATIENT (OUTPATIENT)
Dept: OUTPATIENT SERVICES | Facility: HOSPITAL | Age: 63
LOS: 1 days | End: 2023-08-21

## 2023-08-21 ENCOUNTER — NON-APPOINTMENT (OUTPATIENT)
Age: 63
End: 2023-08-21

## 2023-08-21 DIAGNOSIS — Z01.419 ENCOUNTER FOR GYNECOLOGICAL EXAMINATION (GENERAL) (ROUTINE) WITHOUT ABNORMAL FINDINGS: ICD-10-CM

## 2023-08-21 NOTE — DISCUSSION/SUMMARY
[FreeTextEntry1] : I contacted Christine's phone at 2:30pm and spoke to her sister who speaks English. I reached out to inform them that we can now proceed with a full intake. I explored their availability for this week, and it was decided that they would come into the clinic tomorrow to complete the intake assessment.

## 2023-08-22 ENCOUNTER — OUTPATIENT (OUTPATIENT)
Dept: OUTPATIENT SERVICES | Facility: HOSPITAL | Age: 63
LOS: 1 days | End: 2023-08-22
Payer: MEDICARE

## 2023-08-22 ENCOUNTER — APPOINTMENT (OUTPATIENT)
Dept: PSYCHIATRY | Facility: CLINIC | Age: 63
End: 2023-08-22

## 2023-08-22 DIAGNOSIS — F29 UNSPECIFIED PSYCHOSIS NOT DUE TO A SUBSTANCE OR KNOWN PHYSIOLOGICAL CONDITION: ICD-10-CM

## 2023-08-22 DIAGNOSIS — Z01.419 ENCOUNTER FOR GYNECOLOGICAL EXAMINATION (GENERAL) (ROUTINE) WITHOUT ABNORMAL FINDINGS: ICD-10-CM

## 2023-08-22 PROCEDURE — 90791 PSYCH DIAGNOSTIC EVALUATION: CPT

## 2023-08-23 ENCOUNTER — OUTPATIENT (OUTPATIENT)
Dept: OUTPATIENT SERVICES | Facility: HOSPITAL | Age: 63
LOS: 1 days | End: 2023-08-23
Payer: MEDICARE

## 2023-08-23 ENCOUNTER — APPOINTMENT (OUTPATIENT)
Dept: OPHTHALMOLOGY | Facility: CLINIC | Age: 63
End: 2023-08-23
Payer: MEDICARE

## 2023-08-23 DIAGNOSIS — H53.8 OTHER VISUAL DISTURBANCES: ICD-10-CM

## 2023-08-23 DIAGNOSIS — F29 UNSPECIFIED PSYCHOSIS NOT DUE TO A SUBSTANCE OR KNOWN PHYSIOLOGICAL CONDITION: ICD-10-CM

## 2023-08-23 LAB
CYTOLOGY CVX/VAG DOC THIN PREP: ABNORMAL
HPV HIGH+LOW RISK DNA PNL CVX: NOT DETECTED

## 2023-08-23 PROCEDURE — 92012 INTRM OPH EXAM EST PATIENT: CPT

## 2023-08-23 PROCEDURE — 92020 GONIOSCOPY: CPT

## 2023-08-23 NOTE — HEALTH RISK ASSESSMENT
[Reviewed no changes] : Reviewed, no changes [Designated Healthcare Proxy] : Designated healthcare proxy [I will adhere to the patient's wishes.] : I will adhere to the patient's wishes.

## 2023-08-24 NOTE — RISK ASSESSMENT
[Clinical Interview] : Clinical Interview [Collateral Sources] : Collateral Sources [Yes] : 1. Passive Ideation: Have you wished you were dead or wished you could go to sleep and not wake up? Yes [(2) Once a week] : Frequency: How many times have you had these thoughts? Once a week [(1) Fleeting - few seconds/minutes] : Fleeting - a few seconds or minutes [(1) Easily able to control thoughts] : Easily able to control thoughts [(1) Deterrents definitely stopped you from attempting suicide] : Deterrents definitely stopped you from attempting suicide [(4) Mostly to end or stop the pain (you couldn't go on living with the pain or how you were feeling)] : Mostly to end or stop the pain (you couldn't go on living with the pain or how you were feeling) [Mood disorder] : mood disorder [Psychotic disorder] : psychotic disorder [Depressed mood/Anhedonia] : depressed mood/anhedonia [Psychosis] : psychosis [Hopelessness or despair] : hopelessness or despair [Severe anxiety, agitation or panic] : severe anxiety, agitation or panic [Identifies reasons for living] : identifies reasons for living [Supportive social network of family or friends] : supportive social network of family or friends [Advent beliefs] : Samaritan beliefs [Cultural, spiritual and/or moral attitudes against suicide] : cultural, spiritual and/or moral attitudes against suicide [Positive therapeutic relationships] : positive therapeutic relationships [Responsibility to children, family, or others] : responsibility to children, family, or others [None in the patient's lifetime] : None in the patient's lifetime [None Known] : none known [No known risk factors] : No known risk factors [Residential stability] : residential stability [Relationship stability] : relationship stability [Sobriety] : sobriety [No] : no [TextBox_32] : Christine reports passive suicidal ideation, but it has never escalated beyond this. She has not formulated a method or behaved in such a way that would act upon these passive thoughts. She has no history of suicide attempts.  [FreeTextEntry2] : 9 [FreeTextEntry5] : Christine has many protective factors that aid in the prevention of suicide.  [FreeTextEntry6] : Christine perceives her passive suicidal thoughts to be a response to the mental distress of auditory hallucinations. She understands that if she ever feels unsafe or in crisis, to call the emergency room.

## 2023-08-24 NOTE — DISCUSSION/SUMMARY
[Low acute suicide risk] : Low acute suicide risk [No] : No [Not clinically indicated] : Safety Plan completed/updated (for individuals at risk): Not clinically indicated [FreeTextEntry1] : Christine endorses some passive suicidal ideations; however, she has many protective factors including family support, Congregational, and adherence to treatment.

## 2023-08-24 NOTE — FAMILY HISTORY
[FreeTextEntry1] : Family Composition, History, and Background: Christine was born in [place] and moved to [place] in 1995. Her primary language is Algerian. She is unable to speak English fluently. She was accompanied on the intake interview with her sister Audrey who is able to speak English and translate. Christine has 7 sisters that live in [place], and her one sister that was with her for the intake. She also has a brother who lives in [place]. Christine has a strong relationship with all of her siblings, and she can rely on them for social support, especially her sister Audrey.  Pertinent Family Medical, MH and Substance Use History including Adult Child of Alcoholic and child of substance abuse status; history of cancer and heart disease:   She mentioned that two of her sisters, one older and one younger, both experience depression. Other than this, she did not report any mental health pathology within her family. Christine lives with her son; however, she reports that he is a current stressor in her life. Christine reports that he does not work, and they do not have much contact. She stated that he stays in his room often. She reports that her son, as well as her nephew, engage in substance use. She was unsure of the substances that are used and the intensity/frequency.

## 2023-08-24 NOTE — PAST MEDICAL HISTORY
[FreeTextEntry1] : Gallbladder and tubal ligation.  Urinary incontinence Hypercholesterolemia Colon Polyps Esophageal reflux Abnormal cervical Papanicolaou smear

## 2023-08-24 NOTE — END OF VISIT
[Time Spent: ___ minutes] : I have spent [unfilled] minutes of time on the encounter. [Licensed Clinician] : Licensed Clinician [FreeTextEntry1] : Leonardo Meza LMSW

## 2023-08-24 NOTE — HISTORY OF PRESENT ILLNESS
[Not Applicable] : Not applicable [FreeTextEntry1] : The social work intake assessment began at 2:00pm and ended at 3:30pm. Christine was accompanied by her sister who is able to speak English. Together, they signed and completed consent forms including HIPPA release form. Christine reports symptoms of psychosis that have endured for the majority of her life. She states that these symptoms hit a breaking point in 2016, which is when she decided to seek treatment. She received treatment at [facility]'s Outpatient [facility] Health program on [address] [place] [address]. She mentioned that she was prescribed Paxil while in their care, and that she saw a therapist only a handful of times. Christine stated that she did not like how she was treated there. She felt that the staff invalidated her experience. Christine has not been there since 2018, and has not been taking any medications since then, nor engaged in psychotherapy services. Christine endorses auditory hallucinations that cause distress. She described many voices that have a conversation at once, which makes it difficult for her to make out what they are saying. She states that it can sound like chatter. Christine also mentioned that she is hard of hearing. Furthermore, when she is able to make out what they are saying, it can be described as voices that are telling her that someone is going to break into her house. This connects with other symptoms of paranoia that she also described, which includes the fear that her neighbors are out to get her. Christine explored the challenges of leaving the house and mentioned that she can be in the house for 4 days without leaving. She is able to leave for essentials such as groceries or other errands. Christine noted that she has experienced increased sadness, particularly following the loss of her  and son in recent years. Her  passed away in 2019, and one of her son's passed away in 2020. Christine reports some moments of increased energy as well, which may potentially be manic symptoms. She discussed other symptoms such as crying and feeling a pressure in her chest. There appears to be underlying grief in regard to the loss of her  and son.  [FreeTextEntry2] : Christine last received psychiatric treatment in 2018 at [facility]'s outpatient mental health program. She engaged in a few therapy sessions and was prescribed Paxil for her symptoms. However, Christine did not like it there, and has since been unmedicated.  [FreeTextEntry3] : Paxil - 2018 - Christine stopped taking this medication after terminating from [facility].

## 2023-08-24 NOTE — PLAN
[Admit to Program     (Add Program Admission information to a new column in the Admit/Discharge Flowsheet)] : Admit to program [Every ___ month(s)] : Medication Management: Every [unfilled] month(s) [Every ___ week(s)] : Psychotherapy: Every [unfilled] week(s) [Individual Therapy] : Individual Therapy [FreeTextEntry4] : Recommendation: Christine should explore medication options with psychiatrist to alleviate psychotic symptoms. She should work with a therapist to process grief, anxiety, and life goals/stressors that can be targeted for increased happiness and well-being. She can gradually work on gaining more confidence to do activities by herself, without being fearful of persecution.   Medication Only, [ ]      Individual therapy only, [ ]      Medication and Individual therapy, [X ]      Group therapy

## 2023-08-24 NOTE — PSYCHOSOCIAL ASSESSMENT
[All substances negative except as specified below] : All substances negative except as specified below [HS Diploma or GED] : HS Diploma or GED [Yes (select details below)] : Have you ever experienced this type of event? Yes [Unemployed and looking for work] : unemployed and looking for work [Not applicable] : not applicable [Retired] : retired [SSI] : SSI [None] : none [Private residence (home, apartment, rooming house, hotel, motel, supported housing, supported Single Room Occupancy (SRO),] : Private residence (home, apartment, rooming house, hotel, motel, supported housing, supported Single Room Occupancy (SRO), permanent housing programs, transient housing programs, and shelter plus care housing) [Client's child, stepchild, foster child, grandchild] : client's child, stepchild, foster child, grandchild [Yes] : yes [Sibling] : sibling [Good] : good [Frequent Contact] : frequent contact [No] : Patient has personal representation (legal guardian, representative payee, conservatorship)? No [FreeTextEntry2] : Christine has strong family support including her sister Audrey. She indicates that her son who lives with her can be a stressor because she loves/cares about him, but he is not supporting her around the house. Christine is often worried about her household, and she mentioned that her mental health symptoms provide a barrier toward ADL activities.  [had nightmares about the event(s) or thought about the event(s) when you did not want] : did not have nightmares and/or unwanted thoughts about the events [tried hard not to think about the event(s) or went out of your way to avoid situations that reminded you of the event] : did not need to avoid thinking about events, did not need to avoid situations that might remind patient of events [has been constantly on guard, watchful, or easily startled] : has not been constantly on guard, watchful, or easily startled [felt numb or detached from people, activities, or your surrounding] : has not felt numb or detached from people, activities, or surroundings [felt guilty or unable to stop blaming yourself or others for the event(s) or any problems the event(s) may have caused] : has not felt guilty or unable to stop blaming self or others for event(s), or any problems the event(s) may have caused [FreeTextEntry3] : Christine is able to support herself with disability payments that she receives monthly.  [FreeTextEntry4] : Christine lives with her son in a private residence. [FreeTextEntry7] : Audrey - Sister [FreeTextEntry1] : Christine receives strong support from her sister Audrey. Audrey accompanied her on the intake interview and provided translation.

## 2023-08-24 NOTE — PHYSICAL EXAM
[None] : none [Cooperative] : cooperative [Euthymic] : euthymic [Full] : full [Clear] : clear [Linear/Goal Directed] : linear/goal directed [Paranoid] : paranoid [Persecution] : persecution [Hallucinations - Auditory] : hallucinations - auditory [Average] : average [WNL] : within normal limits [de-identified] : Christine began crying when discussing her auditory hallucinations.  [FreeTextEntry7] : Christine discussed fear of people breaking into her house. [de-identified] : Potential delusions of persecution in relation to neighbors.  [de-identified] : Christine reports auditory hallucinations that tell her someone is going to break into her house.

## 2023-08-24 NOTE — ACTIVE PROBLEMS
[FreeTextEntry1] : Christine experiences various mental health symptoms that interfere with overall functioning. She has a difficult time leaving the house, but reports that she has more confidence when with her sister Audrey. She discussed a strained relationship with her son who lives with her.

## 2023-08-24 NOTE — SOCIAL HISTORY
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 She is able to support herself through disability payments each month.  Developmental hx: Christine has no history of Intellectual/Developmental Disability.  Social supports - meaningful activities: Christine enjoys cooking for leisure and spending time with her family. She has strong family support, as well as social support from her Temple community.  Race/ethnicity, sexual identity, spirituality/Mu-ism: Christine identifies as a Jehova Witness. She values her friends and family, as this is something very important in her culture.  (Spiritual Assessment Tool - [place]. What is your dakota or belief?  Christine is a Jehova Witness and she is actively involved in her Mu-ism. 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   Do you consider yourself spiritual or Hindu?    She considers herself to be highly Hindu and she derives strong support from her Hindu network.  Is there something you believe in that gives meaning to your life?  Christine places her dakota in God and believes that he is in heaven protecting her. Much of her actions/behavior is rooted in values/beliefs as a Jehovah Witness.   I: Is it important in your life?  Christine ranks her Mu-ism as a high priority and is something that takes up a majority of her time. 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 Yazdanism is a guiding force in her life.   What influence does it have on how you take care of yourself?  Christine noted that self-injury and suicide go against the principles in her Mu-ism, which influences her behavior and serves as a protective factor. She ensures that she puts family and friends on a high priority list.  How have your beliefs influenced your behavior during this illness? What role do your beliefs play in regaining your health?  Christine's beliefs serve as a protective factor against suicide.  C. Are you part of a spiritual or Hindu community?  Christine belongs to the "[employer] [facility] where she practices her Mu-ism.  Is this of support to you and how?   Christine receives strong support from her peers in the Temple.  Is there a person or group of people you really love or who are really important to you?  Christine loves her family and friends.  H. We have been discussing your belief and supports. What else gives you internal support?  Christine believes that she must live in order to support her family.  What are your sources of hope, strength, comfort and peace? []  Christine has dakota in God which gives her purpose and direction.   What do you hold on to during difficult times? Christine holds onto her belief in God during difficult times, as well as reading the bible.

## 2023-08-24 NOTE — REVIEW OF SYSTEMS
[Negative] : Allergic/Immunologic [de-identified] : Experiences psychosis and depressive/manic symptoms

## 2023-08-24 NOTE — REASON FOR VISIT
[Number can be texted] : number can be texted [OK  to leave message] : OK  to leave message [Other:___] : [unfilled] [[facility] Provider/Facility] : [facility] Provider/Facility [Patient] : Patient [Collateral - Name/Contact Info/Relationship:___] : Collateral: [unfilled] [FreeTextEntry5] : Northern Irish [FreeTextEntry6] : Christine [FreeTextEntry7] : she/her [FreeTextEntry2] : Christine reports significant mental health distress which stems from symptoms including depressed mood, auditory hallucinations, paranoia, and potential hypomania.  [FreeTextEntry1] : Christine endorses psychiatric symptoms that are causing her distress. She reports profound sadness, auditory hallucinations, crying, bouts of increased energy, and paranoia in regard to her neighbors, which makes it challenging for her to leave the house. She is open to psychopharmacological treatment and psychotherapy to treat her symptoms and increase overall functioning throughout the day.

## 2023-09-13 ENCOUNTER — APPOINTMENT (OUTPATIENT)
Dept: PSYCHIATRY | Facility: CLINIC | Age: 63
End: 2023-09-13
Payer: MEDICARE

## 2023-09-13 ENCOUNTER — OUTPATIENT (OUTPATIENT)
Dept: OUTPATIENT SERVICES | Facility: HOSPITAL | Age: 63
LOS: 1 days | End: 2023-09-13
Payer: MEDICARE

## 2023-09-13 DIAGNOSIS — F33.3 MAJOR DEPRESSIVE DISORDER, RECURRENT, SEVERE WITH PSYCHOTIC SYMPTOMS: ICD-10-CM

## 2023-09-13 DIAGNOSIS — F33.1 MAJOR DEPRESSIVE DISORDER, RECURRENT, MODERATE: ICD-10-CM

## 2023-09-13 PROCEDURE — 90792 PSYCH DIAG EVAL W/MED SRVCS: CPT | Mod: U4

## 2023-09-13 PROCEDURE — 90792 PSYCH DIAG EVAL W/MED SRVCS: CPT | Mod: GC,U4

## 2023-09-14 DIAGNOSIS — F33.3 MAJOR DEPRESSIVE DISORDER, RECURRENT, SEVERE WITH PSYCHOTIC SYMPTOMS: ICD-10-CM

## 2023-09-15 ENCOUNTER — OUTPATIENT (OUTPATIENT)
Dept: OUTPATIENT SERVICES | Facility: HOSPITAL | Age: 63
LOS: 1 days | End: 2023-09-15
Payer: MEDICARE

## 2023-09-15 ENCOUNTER — RESULT REVIEW (OUTPATIENT)
Age: 63
End: 2023-09-15

## 2023-09-15 DIAGNOSIS — Z12.31 ENCOUNTER FOR SCREENING MAMMOGRAM FOR MALIGNANT NEOPLASM OF BREAST: ICD-10-CM

## 2023-09-15 PROCEDURE — 77067 SCR MAMMO BI INCL CAD: CPT

## 2023-09-15 PROCEDURE — 77063 BREAST TOMOSYNTHESIS BI: CPT

## 2023-09-15 PROCEDURE — 77067 SCR MAMMO BI INCL CAD: CPT | Mod: 26

## 2023-09-15 PROCEDURE — 77063 BREAST TOMOSYNTHESIS BI: CPT | Mod: 26

## 2023-09-16 DIAGNOSIS — Z12.31 ENCOUNTER FOR SCREENING MAMMOGRAM FOR MALIGNANT NEOPLASM OF BREAST: ICD-10-CM

## 2023-09-19 ENCOUNTER — NON-APPOINTMENT (OUTPATIENT)
Age: 63
End: 2023-09-19

## 2023-09-20 DIAGNOSIS — Z01.419 ENCOUNTER FOR GYNECOLOGICAL EXAMINATION (GENERAL) (ROUTINE) W/OUT ABNORMAL FINDINGS: ICD-10-CM

## 2023-09-26 ENCOUNTER — ASOB RESULT (OUTPATIENT)
Age: 63
End: 2023-09-26

## 2023-09-26 ENCOUNTER — OUTPATIENT (OUTPATIENT)
Dept: OUTPATIENT SERVICES | Facility: HOSPITAL | Age: 63
LOS: 1 days | End: 2023-09-26
Payer: MEDICARE

## 2023-09-26 ENCOUNTER — APPOINTMENT (OUTPATIENT)
Dept: ANTEPARTUM | Facility: CLINIC | Age: 63
End: 2023-09-26
Payer: MEDICARE

## 2023-09-26 DIAGNOSIS — Z00.00 ENCOUNTER FOR GENERAL ADULT MEDICAL EXAMINATION WITHOUT ABNORMAL FINDINGS: ICD-10-CM

## 2023-09-26 PROCEDURE — 76830 TRANSVAGINAL US NON-OB: CPT

## 2023-09-26 PROCEDURE — 76856 US EXAM PELVIC COMPLETE: CPT | Mod: 26,59

## 2023-09-26 PROCEDURE — 76830 TRANSVAGINAL US NON-OB: CPT | Mod: 26

## 2023-09-26 PROCEDURE — 76856 US EXAM PELVIC COMPLETE: CPT

## 2023-09-27 ENCOUNTER — OUTPATIENT (OUTPATIENT)
Dept: OUTPATIENT SERVICES | Facility: HOSPITAL | Age: 63
LOS: 1 days | End: 2023-09-27
Payer: MEDICARE

## 2023-09-27 ENCOUNTER — APPOINTMENT (OUTPATIENT)
Dept: PSYCHIATRY | Facility: CLINIC | Age: 63
End: 2023-09-27
Payer: MEDICARE

## 2023-09-27 DIAGNOSIS — F33.3 MAJOR DEPRESSIVE DISORDER, RECURRENT, SEVERE WITH PSYCHOTIC SYMPTOMS: ICD-10-CM

## 2023-09-27 PROCEDURE — ZZZZZ: CPT

## 2023-09-27 PROCEDURE — 99214 OFFICE O/P EST MOD 30 MIN: CPT

## 2023-09-27 RX ORDER — TRAZODONE HYDROCHLORIDE 50 MG/1
50 TABLET ORAL
Qty: 30 | Refills: 0 | Status: COMPLETED | COMMUNITY
Start: 2023-09-13 | End: 2023-09-27

## 2023-09-28 DIAGNOSIS — R10.2 PELVIC AND PERINEAL PAIN: ICD-10-CM

## 2023-09-28 DIAGNOSIS — F33.3 MAJOR DEPRESSIVE DISORDER, RECURRENT, SEVERE WITH PSYCHOTIC SYMPTOMS: ICD-10-CM

## 2023-10-19 ENCOUNTER — OUTPATIENT (OUTPATIENT)
Dept: OUTPATIENT SERVICES | Facility: HOSPITAL | Age: 63
LOS: 1 days | End: 2023-10-19
Payer: MEDICARE

## 2023-10-19 ENCOUNTER — APPOINTMENT (OUTPATIENT)
Dept: PSYCHIATRY | Facility: CLINIC | Age: 63
End: 2023-10-19
Payer: MEDICARE

## 2023-10-19 DIAGNOSIS — F33.3 MAJOR DEPRESSIVE DISORDER, RECURRENT, SEVERE WITH PSYCHOTIC SYMPTOMS: ICD-10-CM

## 2023-10-19 DIAGNOSIS — F31.73 BIPOLAR DISORDER, IN PARTIAL REMISSION, MOST RECENT EPISODE MANIC: ICD-10-CM

## 2023-10-19 PROCEDURE — 99214 OFFICE O/P EST MOD 30 MIN: CPT

## 2023-10-19 PROCEDURE — ZZZZZ: CPT

## 2023-10-20 DIAGNOSIS — F33.3 MAJOR DEPRESSIVE DISORDER, RECURRENT, SEVERE WITH PSYCHOTIC SYMPTOMS: ICD-10-CM

## 2023-11-15 ENCOUNTER — APPOINTMENT (OUTPATIENT)
Dept: PSYCHIATRY | Facility: CLINIC | Age: 63
End: 2023-11-15
Payer: MEDICARE

## 2023-11-15 ENCOUNTER — OUTPATIENT (OUTPATIENT)
Dept: OUTPATIENT SERVICES | Facility: HOSPITAL | Age: 63
LOS: 1 days | End: 2023-11-15
Payer: MEDICARE

## 2023-11-15 DIAGNOSIS — F33.3 MAJOR DEPRESSIVE DISORDER, RECURRENT, SEVERE WITH PSYCHOTIC SYMPTOMS: ICD-10-CM

## 2023-11-15 PROCEDURE — ZZZZZ: CPT

## 2023-11-15 PROCEDURE — 99214 OFFICE O/P EST MOD 30 MIN: CPT

## 2023-11-16 DIAGNOSIS — F33.3 MAJOR DEPRESSIVE DISORDER, RECURRENT, SEVERE WITH PSYCHOTIC SYMPTOMS: ICD-10-CM

## 2023-11-27 NOTE — SURGICAL HISTORY
none [FreeTextEntry1] : Christine has had gallbladder surgery as well as tubal ligation. There have not been any complications with these surgeries.

## 2023-12-13 ENCOUNTER — APPOINTMENT (OUTPATIENT)
Dept: PSYCHIATRY | Facility: CLINIC | Age: 63
End: 2023-12-13
Payer: MEDICARE

## 2023-12-13 ENCOUNTER — OUTPATIENT (OUTPATIENT)
Dept: OUTPATIENT SERVICES | Facility: HOSPITAL | Age: 63
LOS: 1 days | End: 2023-12-13
Payer: MEDICARE

## 2023-12-13 DIAGNOSIS — F33.3 MAJOR DEPRESSIVE DISORDER, RECURRENT, SEVERE WITH PSYCHOTIC SYMPTOMS: ICD-10-CM

## 2023-12-13 PROCEDURE — ZZZZZ: CPT

## 2023-12-13 PROCEDURE — 99214 OFFICE O/P EST MOD 30 MIN: CPT

## 2023-12-13 NOTE — FAMILY HISTORY
[FreeTextEntry1] : Family Composition, History, and Background: Christine was born in [place] and moved to [place] in 1995. Her primary language is Citizen of [place]. She is unable to speak English fluently. Christine has 7 sisters that live in [place]. She also has a brother who lives in [place]. Christine has a strong relationship with all of her siblings, and she can rely on them for social support, especially her sister Audrey.  Pertinent Family Medical, MH and Substance Use History including Adult Child of Alcoholic and child of substance abuse status; history of cancer and heart disease:   She mentioned that two of her sisters, one older and one younger, both experience depression. Other than this, she did not report any mental health pathology within her family. Christine lives with her son; however, she reports that he is a current stressor in her life. Christine reports that he does not work, and they do not have much contact. She stated that he stays in his room often. She reports that her son, as well as her nephew, engage in substance use. She was unsure of the substances that are used and the intensity/frequency.

## 2023-12-13 NOTE — HISTORY OF PRESENT ILLNESS
[Not Applicable] : Not applicable [FreeTextEntry1] : HPI:   Patient is a 62 yo f, , domicilied in private residence with her adult son, emmigrated from [facility] in 1995, currently on disability, pmh of HLD, PPH of depression with psychotic features previously but not previous psychiatric hospitalizations and has been without psychiatric care for ~ 5 years. Patient is referal from hospital after patient was admitted to medical unit for finger ulcer and revealed psychiatric hx. Patient presents today for intake.   On encounter patient is well dressed and groomed, however, she is notably tearful throughout interview. Patient reports today that she came to [place] in [place] and may have always had some underlying sadness and anxiety but was never in psychiatric care. She reports that in 2007 her sons started using substances which lead her to feel stressed and betrayed. She reports that things progressed and reports in 2012 she began having some notable depression, describing difficulty sleeping, anhedonia, excessive crying and sadness, poor appetite and energy. She reports that she remained with many of these sx and soon she began experiencing AH and paranoia. She reported that the voices began as muffled but then would be clear at times and tell her they were going to hurt her. She reports that she began feeling that people were trying to break into her home and harm her. She reported that 2015 these sx worsened to the point she became quite scared and left her home numerous times. She reports that in one instance she returned home, felt someone was breaking into her house and she was about to jump from her balcony for safety but did not do so because of the window guards. She reports that she was brought to the hospital by fellow Latter day members, was placed in obs and dischaged with medicaition. She reports she tried at least 2 medication in outpatient (reports 1 caused a rash and then was placed on paroxetine and titrated to 40mg). She reports paroxetine helped her have improvement in voices and depression but reports there was never remission of AH. She reports that on one instance she was unable to reschedule a session as her clinic did not  and therefore she continued to have her PMD write her paroxetine until he passed away himself. She reports she has tried to look for psychaitric help for 3 + years to no avail. She reports that in the interm her  passed from a heart attack in 2019 and her son from convulsions in 2020 which has lead her to have return of depressive sx including poor sleep, anhedonia, poor energy and appetite as well as return of AH and paranoia. She reports it is not as bad as it had been years ago but she reports she hears muffled speaking, voices blaming her for something and reports that she feels she is being watched outside. She denied visual hallucinations. She reported some passive SI of not wishing to be alive due to how she feels but denied si and reported that it is against her Jainism (jahova's witness). She reported some fear of being followed but did not report overt anxiety. Patient denied hx of hayden or manic like sx.   Treatment options were discussed with her and after discussing risk, benefits and alternatives, including restarting paroxetine, patient wished to try sertraline and try trazadone for sleep.  [FreeTextEntry3] : reported took medication but reports she got an allergice reaction to it and could not name the medication.  Paxil - 2018 - Christine reports was unable to take medication in > 3 years after she was unable to follow up with [facility] and her pmd stopped prescribing paxil  [FreeTextEntry2] : Christine last received psychiatric treatment in 2018 at [facility]'s outpatient mental health program. She engaged in a few therapy sessions and was prescribed Paxil for her symptoms. However, Christine did not like it there, and has since been unmedicated.

## 2023-12-13 NOTE — SOCIAL HISTORY
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 She is able to support herself through disability payments each month.  Developmental hx: Christine has no history of Intellectual/Developmental Disability.  Social supports - meaningful activities: Christine enjoys cooking for leisure and spending time with her family. She has strong family support, as well as social support from her Moravian community.  Race/ethnicity, sexual identity, spirituality/Taoism: Christine identifies as a Jehova Witness. She values her friends and family, as this is something very important in her culture.  (Spiritual Assessment Tool - TWAN DE [place] [employer]ARZA. What is your dakota or belief?  Christine is a Jehova Witness and she is actively involved in her Taoism. 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   Do you consider yourself spiritual or Sikh?    She considers herself to be highly Sikh and she derives strong support from her Sikh network.  Is there something you believe in that gives meaning to your life?  Christine places her dakota in God and believes that he is in heaven protecting her. Much of her actions/behavior is rooted in values/beliefs as a Jehovah Witness.   I: Is it important in your life?  Christine ranks her Taoism as a high priority and is something that takes up a majority of her time. 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 Tenriism is a guiding force in her life.   What influence does it have on how you take care of yourself?  Christine noted that self-injury and suicide go against the principles in her Taoism, which influences her behavior and serves as a protective factor. She ensures that she puts family and friends on a high priority list.  How have your beliefs influenced your behavior during this illness? What role do your beliefs play in regaining your health?  Christine's beliefs serve as a protective factor against suicide.  C. Are you part of a spiritual or Sikh community?  Christine belongs to the [employer] [facility] where she practices her Taoism.  Is this of support to you and how?   Christine receives strong support from her peers in the [employer].  Is there a person or group of people you really love or who are really important to you?  Christine loves her family and friends.  H. We have been discussing your belief and supports. What else gives you internal support?  Christine believes that she must live in order to support her family.  What are your sources of hope, strength, comfort and peace? []  Christine has dakota in God which gives her purpose and direction.   What do you hold on to during difficult times? Christine holds onto her belief in God during difficult times, as well as reading the bible.

## 2023-12-14 DIAGNOSIS — F33.3 MAJOR DEPRESSIVE DISORDER, RECURRENT, SEVERE WITH PSYCHOTIC SYMPTOMS: ICD-10-CM

## 2023-12-27 ENCOUNTER — OUTPATIENT (OUTPATIENT)
Dept: OUTPATIENT SERVICES | Facility: HOSPITAL | Age: 63
LOS: 1 days | End: 2023-12-27
Payer: MEDICARE

## 2023-12-27 ENCOUNTER — APPOINTMENT (OUTPATIENT)
Dept: OPHTHALMOLOGY | Facility: CLINIC | Age: 63
End: 2023-12-27
Payer: MEDICARE

## 2023-12-27 DIAGNOSIS — H02.88A MEIBOMIAN GLAND DYSFUNCTION RIGHT EYE, UPPER AND LOWER EYELIDS: ICD-10-CM

## 2023-12-27 DIAGNOSIS — H53.8 OTHER VISUAL DISTURBANCES: ICD-10-CM

## 2023-12-27 DIAGNOSIS — H02.88B MEIBOMIAN GLAND DYSFUNCTION LEFT EYE, UPPER AND LOWER EYELIDS: ICD-10-CM

## 2023-12-27 DIAGNOSIS — H04.123 DRY EYE SYNDROME OF BILATERAL LACRIMAL GLANDS: ICD-10-CM

## 2023-12-27 DIAGNOSIS — H40.013 OPEN ANGLE WITH BORDERLINE FINDINGS, LOW RISK, BILATERAL: ICD-10-CM

## 2023-12-27 PROCEDURE — 92012 INTRM OPH EXAM EST PATIENT: CPT

## 2024-01-03 ENCOUNTER — OUTPATIENT (OUTPATIENT)
Dept: OUTPATIENT SERVICES | Facility: HOSPITAL | Age: 64
LOS: 1 days | End: 2024-01-03
Payer: MEDICARE

## 2024-01-03 ENCOUNTER — APPOINTMENT (OUTPATIENT)
Dept: PSYCHIATRY | Facility: CLINIC | Age: 64
End: 2024-01-03
Payer: MEDICARE

## 2024-01-03 DIAGNOSIS — F33.3 MAJOR DEPRESSIVE DISORDER, RECURRENT, SEVERE WITH PSYCHOTIC SYMPTOMS: ICD-10-CM

## 2024-01-03 PROCEDURE — ZZZZZ: CPT

## 2024-01-03 PROCEDURE — 99214 OFFICE O/P EST MOD 30 MIN: CPT

## 2024-01-03 RX ORDER — ARIPIPRAZOLE 5 MG/1
5 TABLET ORAL DAILY
Qty: 30 | Refills: 0 | Status: COMPLETED | COMMUNITY
Start: 2023-12-13 | End: 2024-01-01

## 2024-01-03 NOTE — SOCIAL HISTORY
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 She is able to support herself through disability payments each month.  Developmental hx: Christine has no history of Intellectual/Developmental Disability.  Social supports - meaningful activities: Christine enjoys cooking for leisure and spending time with her family. She has strong family support, as well as social support from her [employer]ian community.  Race/ethnicity, sexual identity, spirituality/Sikhism: Christine identifies as a Jehova Witness. She values her friends and family, as this is something very important in her culture.  (Spiritual Assessment Tool - TWAN DE [place] [employer]ZA. What is your dakota or belief?  Christine is a Jehova Witness and she is actively involved in her Sikhism. 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   Do you consider yourself spiritual or Christianity?    She considers herself to be highly Christianity and she derives strong support from her Christianity network.  Is there something you believe in that gives meaning to your life?  Christine places her dakota in God and believes that he is in heaven protecting her. Much of her actions/behavior is rooted in values/beliefs as a Jehovah Witness.   I: Is it important in your life?  Christine ranks her Sikhism as a high priority and is something that takes up a majority of her time. 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 Shinto is a guiding force in her life.   What influence does it have on how you take care of yourself?  Christine noted that self-injury and suicide go against the principles in her Sikhism, which influences her behavior and serves as a protective factor. She ensures that she puts family and friends on a high priority list.  How have your beliefs influenced your behavior during this illness? What role do your beliefs play in regaining your health?  Christine's beliefs serve as a protective factor against suicide.  C. Are you part of a spiritual or Christianity community?  Christine belongs to the [employer] [facility] where she practices her Sikhism.  Is this of support to you and how?   Christine receives strong support from her peers in the [employer].  Is there a person or group of people you really love or who are really important to you?  Christine loves her family and friends.  H. We have been discussing your belief and supports. What else gives you internal support?  Christine believes that she must live in order to support her family.  What are your sources of hope, strength, comfort and peace? []  Christine has dakota in God which gives her purpose and direction.   What do you hold on to during difficult times? Christine holds onto her belief in God during difficult times, as well as reading the bible.

## 2024-01-03 NOTE — FAMILY HISTORY
[FreeTextEntry1] : Family Composition, History, and Background: Christine was born in [place] and moved to [place] in 1995. Her primary language is Nigerien. She is unable to speak English fluently. Christine has 7 sisters that live in [place]. She also has a brother who lives in [place]. Christine has a strong relationship with all of her siblings, and she can rely on them for social support, especially her sister Audrey.  Pertinent Family Medical, MH and Substance Use History including Adult Child of Alcoholic and child of substance abuse status; history of cancer and heart disease:   She mentioned that two of her sisters, one older and one younger, both experience depression. Other than this, she did not report any mental health pathology within her family. Christine lives with her son; however, she reports that he is a current stressor in her life. Christine reports that he does not work, and they do not have much contact. She stated that he stays in his room often. She reports that her son, as well as her nephew, engage in substance use. She was unsure of the substances that are used and the intensity/frequency.

## 2024-01-03 NOTE — HISTORY OF PRESENT ILLNESS
[Not Applicable] : Not applicable [FreeTextEntry1] : HPI:   Patient is a 62 yo f, , domicilied in private residence with her adult son, emmigrated from [facility] in 1995, currently on disability, pmh of HLD, PPH of depression with psychotic features previously but not previous psychiatric hospitalizations and has been without psychiatric care for ~ 5 years. Patient is referal from hospital after patient was admitted to medical unit for finger ulcer and revealed psychiatric hx. Patient presents today for intake.   On encounter patient is well dressed and groomed, however, she is notably tearful throughout interview. Patient reports today that she came to [place] in [place] and may have always had some underlying sadness and anxiety but was never in psychiatric care. She reports that in 2007 her sons started using substances which lead her to feel stressed and betrayed. She reports that things progressed and reports in 2012 she began having some notable depression, describing difficulty sleeping, anhedonia, excessive crying and sadness, poor appetite and energy. She reports that she remained with many of these sx and soon she began experiencing AH and paranoia. She reported that the voices began as muffled but then would be clear at times and tell her they were going to hurt her. She reports that she began feeling that people were trying to break into her home and harm her. She reported that 2015 these sx worsened to the point she became quite scared and left her home numerous times. She reports that in one instance she returned home, felt someone was breaking into her house and she was about to jump from her balcony for safety but did not do so because of the window guards. She reports that she was brought to the hospital by fellow [facility] members, was placed in obs and dischaged with medicaition. She reports she tried at least 2 medication in outpatient (reports 1 caused a rash and then was placed on paroxetine and titrated to 40mg). She reports paroxetine helped her have improvement in voices and depression but reports there was never remission of AH. She reports that on one instance she was unable to reschedule a session as her clinic did not  and therefore she continued to have her PMD write her paroxetine until he passed away himself. She reports she has tried to look for psychaitric help for 3 + years to no avail. She reports that in the interm her  passed from a heart attack in 2019 and her son from convulsions in 2020 which has lead her to have return of depressive sx including poor sleep, anhedonia, poor energy and appetite as well as return of AH and paranoia. She reports it is not as bad as it had been years ago but she reports she hears muffled speaking, voices blaming her for something and reports that she feels she is being watched outside. She denied visual hallucinations. She reported some passive SI of not wishing to be alive due to how she feels but denied si and reported that it is against her Mandaen (jahova's witness). She reported some fear of being followed but did not report overt anxiety. Patient denied hx of hayden or manic like sx.   Treatment options were discussed with her and after discussing risk, benefits and alternatives, including restarting paroxetine, patient wished to try sertraline and try trazadone for sleep.  [FreeTextEntry2] : Christine last received psychiatric treatment in 2018 at [facility]'s outpatient mental health program. She engaged in a few therapy sessions and was prescribed Paxil for her symptoms. However, Christine did not like it there, and has since been unmedicated.  [FreeTextEntry3] : reported took medication but reports she got an allergice reaction to it and could not name the medication.  Paxil - 2018 - Christine reports was unable to take medication in > 3 years after she was unable to follow up with [facility] and her pmd stopped prescribing paxil  tried abilify dec 2023: copious vomiting

## 2024-01-03 NOTE — PHYSICAL EXAM
[None] : none [Cooperative] : cooperative [Constricted] : constricted [Clear] : clear [Linear/Goal Directed] : linear/goal directed [Hallucinations - Auditory] : hallucinations - auditory [Average] : average [WNL] : within normal limits [FreeTextEntry1] : well dressed and groomed [FreeTextEntry8] : "better" [de-identified] : Euthymic [de-identified] : denied active, reports continued intermittent AH

## 2024-01-03 NOTE — DISCUSSION/SUMMARY
[FreeTextEntry1] : Patient is a 64 yo f, , domiciled in private residence with her adult son, emigrated from [place] in 1995, currently on disability, pmh of HLD, PPH of depression with psychotic features previously but not previous psychiatric hospitalizations and has been without psychiatric care for ~ 5 years. On evaluation patient reports a history of sever MDD with psychotic features which previously responded to SSRIs and worsened after she could no longer medications. She now presents with exacerbation with depressive symptoms with passive once more as well as recurrence of psychotic symptoms. After discussing treatment options including risk, benefits and alternatives, patient was agreeable to trial of sertraline as well as trazadone. Patient presents today for follow up.   Today patient reports trial of abilify did not go well, reports some contined depressive sx and continued fear and voices. After discussing treatment options, risks and benefits, patient was agreeable to trial of risperidione with continued monitoring. Patient denied acute concerns at this time, denied si/hi, was linear in thought, not manic or psychotic during encounter.   Risk factors of MDD with psychotic features is largely mitigated by her help seeking behavior, her previous response to medications, her strong support system, her engagement with relgion and her denial of active si/hi.   plan:  - continue sertraline 100mg po daily - trial risperdone 0.5mg po bid - stopped abilify: patient with copious vomiting - stopped trazodone,  - continue mirtazapine 7.5mg 1/2 pill qhs - RTC in 4 weeks for response to sleep.

## 2024-01-04 DIAGNOSIS — F33.3 MAJOR DEPRESSIVE DISORDER, RECURRENT, SEVERE WITH PSYCHOTIC SYMPTOMS: ICD-10-CM

## 2024-01-11 ENCOUNTER — NON-APPOINTMENT (OUTPATIENT)
Age: 64
End: 2024-01-11

## 2024-01-17 ENCOUNTER — APPOINTMENT (OUTPATIENT)
Dept: PSYCHIATRY | Facility: CLINIC | Age: 64
End: 2024-01-17
Payer: MEDICARE

## 2024-01-17 ENCOUNTER — OUTPATIENT (OUTPATIENT)
Dept: OUTPATIENT SERVICES | Facility: HOSPITAL | Age: 64
LOS: 1 days | End: 2024-01-17
Payer: MEDICARE

## 2024-01-17 DIAGNOSIS — F33.3 MAJOR DEPRESSIVE DISORDER, RECURRENT, SEVERE WITH PSYCHOTIC SYMPTOMS: ICD-10-CM

## 2024-01-17 PROCEDURE — ZZZZZ: CPT

## 2024-01-17 PROCEDURE — 99214 OFFICE O/P EST MOD 30 MIN: CPT

## 2024-01-17 NOTE — FAMILY HISTORY
Patient tolerated blood transfusion well.  Patient discharged via wheelchair, accompanied by wife.   [FreeTextEntry1] : Family Composition, History, and Background: Christine was born in [place] and moved to [place] in 1995. Her primary language is Turkmen. She is unable to speak English fluently. Christine has 7 sisters that live in [place]. She also has a brother who lives in [place]. Christine has a strong relationship with all of her siblings, and she can rely on them for social support, especially her sister Audrey.  Pertinent Family Medical, MH and Substance Use History including Adult Child of Alcoholic and child of substance abuse status; history of cancer and heart disease:   She mentioned that two of her sisters, one older and one younger, both experience depression. Other than this, she did not report any mental health pathology within her family. Christine lives with her son; however, she reports that he is a current stressor in her life. Christine reports that he does not work, and they do not have much contact. She stated that he stays in his room often. She reports that her son, as well as her nephew, engage in substance use. She was unsure of the substances that are used and the intensity/frequency.

## 2024-01-17 NOTE — HISTORY OF PRESENT ILLNESS
[Not Applicable] : Not applicable [FreeTextEntry1] : HPI:   Patient is a 64 yo f, , domicilied in private residence with her adult son, emmigrated from [facility] in 1995, currently on disability, pmh of HLD, PPH of depression with psychotic features previously but not previous psychiatric hospitalizations and has been without psychiatric care for ~ 5 years. Patient is referal from hospital after patient was admitted to medical unit for finger ulcer and revealed psychiatric hx. Patient presents today for intake.   On encounter patient is well dressed and groomed, however, she is notably tearful throughout interview. Patient reports today that she came to [place] in [place] and may have always had some underlying sadness and anxiety but was never in psychiatric care. She reports that in 2007 her sons started using substances which lead her to feel stressed and betrayed. She reports that things progressed and reports in 2012 she began having some notable depression, describing difficulty sleeping, anhedonia, excessive crying and sadness, poor appetite and energy. She reports that she remained with many of these sx and soon she began experiencing AH and paranoia. She reported that the voices began as muffled but then would be clear at times and tell her they were going to hurt her. She reports that she began feeling that people were trying to break into her home and harm her. She reported that 2015 these sx worsened to the point she became quite scared and left her home numerous times. She reports that in one instance she returned home, felt someone was breaking into her house and she was about to jump from her balcony for safety but did not do so because of the window guards. She reports that she was brought to the hospital by fellow [employer] members, was placed in obs and dischaged with medicaition. She reports she tried at least 2 medication in outpatient (reports 1 caused a rash and then was placed on paroxetine and titrated to 40mg). She reports paroxetine helped her have improvement in voices and depression but reports there was never remission of AH. She reports that on one instance she was unable to reschedule a session as her clinic did not  and therefore she continued to have her PMD write her paroxetine until he passed away himself. She reports she has tried to look for psychaitric help for 3 + years to no avail. She reports that in the interm her  passed from a heart attack in 2019 and her son from convulsions in 2020 which has lead her to have return of depressive sx including poor sleep, anhedonia, poor energy and appetite as well as return of AH and paranoia. She reports it is not as bad as it had been years ago but she reports she hears muffled speaking, voices blaming her for something and reports that she feels she is being watched outside. She denied visual hallucinations. She reported some passive SI of not wishing to be alive due to how she feels but denied si and reported that it is against her Adventist (jahova's witness). She reported some fear of being followed but did not report overt anxiety. Patient denied hx of hayden or manic like sx.   Treatment options were discussed with her and after discussing risk, benefits and alternatives, including restarting paroxetine, patient wished to try sertraline and try trazadone for sleep.  [FreeTextEntry2] : Christine last received psychiatric treatment in 2018 at [facility]'s outpatient mental health program. She engaged in a few therapy sessions and was prescribed Paxil for her symptoms. However, Christine did not like it there, and has since been unmedicated.  [FreeTextEntry3] : reported took medication but reports she got an allergice reaction to it and could not name the medication.  Paxil - 2018 - Christine reports was unable to take medication in > 3 years after she was unable to follow up with [facility] and her pmd stopped prescribing paxil  tried abilify dec 2023: copious vomiting

## 2024-01-17 NOTE — PHYSICAL EXAM
[None] : none [Cooperative] : cooperative [Constricted] : constricted [Clear] : clear [Linear/Goal Directed] : linear/goal directed [Hallucinations - Auditory] : hallucinations - auditory [Average] : average [WNL] : within normal limits [FreeTextEntry1] : well dressed and groomed [FreeTextEntry8] : "better" [de-identified] : Euthymic [de-identified] : denied active, reports continued intermittent AH

## 2024-01-17 NOTE — SOCIAL HISTORY
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 She is able to support herself through disability payments each month.  Developmental hx: Christine has no history of Intellectual/Developmental Disability.  Social supports - meaningful activities: Christine enjoys cooking for leisure and spending time with her family. She has strong family support, as well as social support from her Yarsanism community.  Race/ethnicity, sexual identity, spirituality/Mandaen: Christine identifies as a Jehova Witness. She values her friends and family, as this is something very important in her culture.  (Spiritual Assessment Tool - TWAN DE LA GARZA. What is your dakota or belief?  Christine is a Jehova Witness and she is actively involved in her Mandaen. 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   Do you consider yourself spiritual or Uatsdin?    She considers herself to be highly Uatsdin and she derives strong support from her Uatsdin network.  Is there something you believe in that gives meaning to your life?  Christine places her dakota in God and believes that he is in heaven protecting her. Much of her actions/behavior is rooted in values/beliefs as a Jehovah Witness.   I: Is it important in your life?  Christine ranks her Mandaen as a high priority and is something that takes up a majority of her time. 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 Worship is a guiding force in her life.   What influence does it have on how you take care of yourself?  Christine noted that self-injury and suicide go against the principles in her Mandaen, which influences her behavior and serves as a protective factor. She ensures that she puts family and friends on a high priority list.  How have your beliefs influenced your behavior during this illness? What role do your beliefs play in regaining your health?  Christine's beliefs serve as a protective factor against suicide.  C. Are you part of a spiritual or Uatsdin community?  Christine belongs to the [employer] [facility] where she practices her Mandaen.  Is this of support to you and how?   Christine receives strong support from her peers in the Yarsanism.  Is there a person or group of people you really love or who are really important to you?  Christine loves her family and friends.  H. We have been discussing your belief and supports. What else gives you internal support?  Christine believes that she must live in order to support her family.  What are your sources of hope, strength, comfort and peace? []  Christine has dakota in God which gives her purpose and direction.   What do you hold on to during difficult times? Christine holds onto her belief in God during difficult times, as well as reading the bible.

## 2024-01-17 NOTE — PHYSICAL EXAM
[None] : none [Cooperative] : cooperative [Constricted] : constricted [Clear] : clear [Linear/Goal Directed] : linear/goal directed [Hallucinations - Auditory] : hallucinations - auditory [Average] : average [WNL] : within normal limits [FreeTextEntry1] : well dressed and groomed [FreeTextEntry8] : "better" [de-identified] : Euthymic [de-identified] : denied active, reports continued intermittent AH

## 2024-01-17 NOTE — HISTORY OF PRESENT ILLNESS
[Not Applicable] : Not applicable [FreeTextEntry1] : HPI:   Patient is a 64 yo f, , domicilied in private residence with her adult son, emmigrated from [facility] in 1995, currently on disability, pmh of HLD, PPH of depression with psychotic features previously but not previous psychiatric hospitalizations and has been without psychiatric care for ~ 5 years. Patient is referal from hospital after patient was admitted to medical unit for finger ulcer and revealed psychiatric hx. Patient presents today for intake.   On encounter patient is well dressed and groomed, however, she is notably tearful throughout interview. Patient reports today that she came to [place] and may have always had some underlying sadness and anxiety but was never in psychiatric care. She reports that in 2007 her sons started using substances which lead her to feel stressed and betrayed. She reports that things progressed and reports in 2012 she began having some notable depression, describing difficulty sleeping, anhedonia, excessive crying and sadness, poor appetite and energy. She reports that she remained with many of these sx and soon she began experiencing AH and paranoia. She reported that the voices began as muffled but then would be clear at times and tell her they were going to hurt her. She reports that she began feeling that people were trying to break into her home and harm her. She reported that 2015 these sx worsened to the point she became quite scared and left her home numerous times. She reports that in one instance she returned home, felt someone was breaking into her house and she was about to jump from her balcony for safety but did not do so because of the window guards. She reports that she was brought to the hospital by fellow Sikhism members, was placed in obs and dischaged with medicaition. She reports she tried at least 2 medication in outpatient (reports 1 caused a rash and then was placed on paroxetine and titrated to 40mg). She reports paroxetine helped her have improvement in voices and depression but reports there was never remission of AH. She reports that on one instance she was unable to reschedule a session as her clinic did not  and therefore she continued to have her PMD write her paroxetine until he passed away himself. She reports she has tried to look for psychaitric help for 3 + years to no avail. She reports that in the interm her  passed from a heart attack in 2019 and her son from convulsions in 2020 which has lead her to have return of depressive sx including poor sleep, anhedonia, poor energy and appetite as well as return of AH and paranoia. She reports it is not as bad as it had been years ago but she reports she hears muffled speaking, voices blaming her for something and reports that she feels she is being watched outside. She denied visual hallucinations. She reported some passive SI of not wishing to be alive due to how she feels but denied si and reported that it is against her Shinto (jahova's witness). She reported some fear of being followed but did not report overt anxiety. Patient denied hx of hayden or manic like sx.   Treatment options were discussed with her and after discussing risk, benefits and alternatives, including restarting paroxetine, patient wished to try sertraline and try trazadone for sleep.  [FreeTextEntry2] : Christine last received psychiatric treatment in 2018 at [facility]'s outpatient mental health program. She engaged in a few therapy sessions and was prescribed Paxil for her symptoms. However, Christine did not like it there, and has since been unmedicated.  [FreeTextEntry3] : reported took medication but reports she got an allergice reaction to it and could not name the medication.  Paxil - 2018 - Christine reports was unable to take medication in > 3 years after she was unable to follow up with [facility] and her pmd stopped prescribing paxil  tried abilify dec 2023: copious vomiting

## 2024-01-17 NOTE — SOCIAL HISTORY
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 She is able to support herself through disability payments each month.  Developmental hx: Christine has no history of Intellectual/Developmental Disability.  Social supports - meaningful activities: Christine enjoys cooking for leisure and spending time with her family. She has strong family support, as well as social support from her Catholic community.  Race/ethnicity, sexual identity, spirituality/Scientologist: Christine identifies as a Jehova Witness. She values her friends and family, as this is something very important in her culture.  (Spiritual Assessment Tool - TWAN DE [employer]ARZA. What is your dakota or belief?  Christine is a Jehova Witness and she is actively involved in her Scientologist. 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   Do you consider yourself spiritual or Confucianist?    She considers herself to be highly Confucianist and she derives strong support from her Confucianist network.  Is there something you believe in that gives meaning to your life?  Christine places her dakota in God and believes that he is in heaven protecting her. Much of her actions/behavior is rooted in values/beliefs as a Jehovah Witness.   I: Is it important in your life?  Christine ranks her Scientologist as a high priority and is something that takes up a majority of her time. 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 Episcopalian is a guiding force in her life.   What influence does it have on how you take care of yourself?  Christine noted that self-injury and suicide go against the principles in her Scientologist, which influences her behavior and serves as a protective factor. She ensures that she puts family and friends on a high priority list.  How have your beliefs influenced your behavior during this illness? What role do your beliefs play in regaining your health?  Christine's beliefs serve as a protective factor against suicide.  C. Are you part of a spiritual or Confucianist community?  Christine belongs to the [facility] where she practices her Scientologist.  Is this of support to you and how?   Christine receives strong support from her peers in the Catholic.  Is there a person or group of people you really love or who are really important to you?  Christine loves her family and friends.  H. We have been discussing your belief and supports. What else gives you internal support?  Christine believes that she must live in order to support her family.  What are your sources of hope, strength, comfort and peace? []  Christine has dakota in God which gives her purpose and direction.   What do you hold on to during difficult times? Christine holds onto her belief in God during difficult times, as well as reading the bible.

## 2024-01-17 NOTE — FAMILY HISTORY
[FreeTextEntry1] : Family Composition, History, and Background: Christine was born in [place] and moved to [place] in 1995. Her primary language is Vietnamese. She is unable to speak English fluently. Christine has 7 sisters that live in [place]. She also has a brother who lives in [place]. Christine has a strong relationship with all of her siblings, and she can rely on them for social support, especially her sister Audrey.  Pertinent Family Medical, MH and Substance Use History including Adult Child of Alcoholic and child of substance abuse status; history of cancer and heart disease:   She mentioned that two of her sisters, one older and one younger, both experience depression. Other than this, she did not report any mental health pathology within her family. Christine lives with her son; however, she reports that he is a current stressor in her life. Christine reports that he does not work, and they do not have much contact. She stated that he stays in his room often. She reports that her son, as well as her nephew, engage in substance use. She was unsure of the substances that are used and the intensity/frequency.

## 2024-01-18 DIAGNOSIS — F33.3 MAJOR DEPRESSIVE DISORDER, RECURRENT, SEVERE WITH PSYCHOTIC SYMPTOMS: ICD-10-CM

## 2024-01-22 NOTE — DISCUSSION/SUMMARY
[Initial Plan] : Initial Plan [Able to manage surrounding demands and opportunities] : able to manage surrounding demands and opportunities [Able to exercise self-direction] : able to exercise self-direction [Adherent to treatment recommendations] : adherent to treatment recommendations [Attempting to realize their potential] : Attempting to realize their potential [Cognitively intact] : cognitively intact [Insightful] : insightful [Motivated to participate in treatment] : motivated to participate in treatment [Motivated and ready for change] : motivated and ready for change [Motivated to maintain or improve physical health] : motivated to maintain or improve physical health [In good health] : in good health [Involved in cultural/spiritual/Christian/community activities] : involved in cultural/spiritual/Christian/community activities [Housing stability] : housing stability [Connected to healthcare] : connected to healthcare [Social supports] : social supports [Mental Health] : Mental Health [FreeTextEntry5] : Get rid of this depression.  [Continued - Progress made] : Continued - Progress made: [every ___ months] : every [unfilled] months [Improvement in symptoms as evidenced by:] : Improvement in symptoms as evidenced by: [Yes] : Yes [Psychiatric Provider/Prescriber] : Psychiatric Provider/Prescriber [FreeTextEntry1] : Major depressive disorder with psychotic features [FreeTextEntry4] : I want to be able not be sad/ cry or be scared all the time.  [de-identified] : Patient engaging in and to continue with medication management  [de-identified] : To take my medications [de-identified] : To come to my doctors' appointments so he can help me get better [de-identified] : patient did not wish to partake [de-identified] : resolution of depressive and psychotic sx as evidenced by sustained improvement in mood, affect and resolution of AVH

## 2024-01-22 NOTE — DISCUSSION/SUMMARY
[Initial Plan] : Initial Plan [Able to manage surrounding demands and opportunities] : able to manage surrounding demands and opportunities [Able to exercise self-direction] : able to exercise self-direction [Adherent to treatment recommendations] : adherent to treatment recommendations [Attempting to realize their potential] : Attempting to realize their potential [Cognitively intact] : cognitively intact [Insightful] : insightful [Motivated to participate in treatment] : motivated to participate in treatment [Motivated and ready for change] : motivated and ready for change [Motivated to maintain or improve physical health] : motivated to maintain or improve physical health [In good health] : in good health [Involved in cultural/spiritual/Shinto/community activities] : involved in cultural/spiritual/Shinto/community activities [Housing stability] : housing stability [Connected to healthcare] : connected to healthcare [Social supports] : social supports [Mental Health] : Mental Health [FreeTextEntry5] : Get rid of this depression.  [Continued - Progress made] : Continued - Progress made: [every ___ months] : every [unfilled] months [Improvement in symptoms as evidenced by:] : Improvement in symptoms as evidenced by: [Yes] : Yes [Psychiatric Provider/Prescriber] : Psychiatric Provider/Prescriber [FreeTextEntry1] : Major depressive disorder with psychotic features [FreeTextEntry4] : I want to be able not be sad/ cry or be scared all the time.  [de-identified] : Patient engaging in and to continue with medication management  [de-identified] : To take my medications [de-identified] : To come to my doctors' appointments so he can help me get better [de-identified] : patient did not wish to partake [de-identified] : resolution of depressive and psychotic sx as evidenced by sustained improvement in mood, affect and resolution of AVH

## 2024-01-31 ENCOUNTER — OUTPATIENT (OUTPATIENT)
Dept: OUTPATIENT SERVICES | Facility: HOSPITAL | Age: 64
LOS: 1 days | End: 2024-01-31
Payer: MEDICARE

## 2024-01-31 DIAGNOSIS — K02.9 DENTAL CARIES, UNSPECIFIED: ICD-10-CM

## 2024-01-31 PROCEDURE — D0140: CPT

## 2024-01-31 PROCEDURE — D0220: CPT

## 2024-02-02 ENCOUNTER — APPOINTMENT (OUTPATIENT)
Dept: OBGYN | Facility: CLINIC | Age: 64
End: 2024-02-02

## 2024-02-05 ENCOUNTER — APPOINTMENT (OUTPATIENT)
Dept: PSYCHIATRY | Facility: CLINIC | Age: 64
End: 2024-02-05
Payer: MEDICARE

## 2024-02-05 ENCOUNTER — OUTPATIENT (OUTPATIENT)
Dept: OUTPATIENT SERVICES | Facility: HOSPITAL | Age: 64
LOS: 1 days | End: 2024-02-05
Payer: MEDICARE

## 2024-02-05 DIAGNOSIS — F33.3 MAJOR DEPRESSIVE DISORDER, RECURRENT, SEVERE WITH PSYCHOTIC SYMPTOMS: ICD-10-CM

## 2024-02-05 PROCEDURE — 99214 OFFICE O/P EST MOD 30 MIN: CPT

## 2024-02-05 PROCEDURE — ZZZZZ: CPT

## 2024-02-05 RX ORDER — MIRTAZAPINE 7.5 MG/1
7.5 TABLET, FILM COATED ORAL
Qty: 30 | Refills: 0 | Status: COMPLETED | COMMUNITY
Start: 2023-09-27 | End: 2024-02-01

## 2024-02-05 NOTE — SOCIAL HISTORY
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 She is able to support herself through disability payments each month.  Developmental hx: Christine has no history of Intellectual/Developmental Disability.  Social supports - meaningful activities: Christine enjoys cooking for leisure and spending time with her family. She has strong family support, as well as social support from her Spiritism community.  Race/ethnicity, sexual identity, spirituality/Methodist: Christine identifies as a Jehova Witness. She values her friends and family, as this is something very important in her culture.  (Spiritual Assessment Tool - TWAN [employer]ARZA. What is your dakota or belief?  Christine is a Jehova Witness and she is actively involved in her Methodist. 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   Do you consider yourself spiritual or Islam?    She considers herself to be highly Islam and she derives strong support from her Islam network.  Is there something you believe in that gives meaning to your life?  Christine places her dakota in God and believes that he is in heaven protecting her. Much of her actions/behavior is rooted in values/beliefs as a Jehovah Witness.   I: Is it important in your life?  Christine ranks her Methodist as a high priority and is something that takes up a majority of her time. 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 Yazidism is a guiding force in her life.   What influence does it have on how you take care of yourself?  Christine noted that self-injury and suicide go against the principles in her Methodist, which influences her behavior and serves as a protective factor. She ensures that she puts family and friends on a high priority list.  How have your beliefs influenced your behavior during this illness? What role do your beliefs play in regaining your health?  Christine's beliefs serve as a protective factor against suicide.  C. Are you part of a spiritual or Islam community?  Christine belongs to the [employer] [facility] where she practices her Methodist.  Is this of support to you and how?   Christine receives strong support from her peers in the Spiritism.  Is there a person or group of people you really love or who are really important to you?  Christine loves her family and friends.  H. We have been discussing your belief and supports. What else gives you internal support?  Christine believes that she must live in order to support her family.  What are your sources of hope, strength, comfort and peace? []  Christine has dakota in God which gives her purpose and direction.   What do you hold on to during difficult times? Christine holds onto her belief in God during difficult times, as well as reading the bible.

## 2024-02-05 NOTE — HISTORY OF PRESENT ILLNESS
[Not Applicable] : Not applicable [FreeTextEntry1] : HPI:   Patient is a 62 yo f, , domicilied in private residence with her adult son, emmigrated from [facility] in [place], currently on disability, pmh of HLD, PPH of depression with psychotic features previously but not previous psychiatric hospitalizations and has been without psychiatric care for ~ 5 years. Patient is referal from hospital after patient was admitted to medical unit for finger ulcer and revealed psychiatric hx. Patient presents today for intake.   On encounter patient is well dressed and groomed, however, she is notably tearful throughout interview. Patient reports today that she came to [place] and may have always had some underlying sadness and anxiety but was never in psychiatric care. She reports that in 2007 her sons started using substances which lead her to feel stressed and betrayed. She reports that things progressed and reports in 2012 she began having some notable depression, describing difficulty sleeping, anhedonia, excessive crying and sadness, poor appetite and energy. She reports that she remained with many of these sx and soon she began experiencing AH and paranoia. She reported that the voices began as muffled but then would be clear at times and tell her they were going to hurt her. She reports that she began feeling that people were trying to break into her home and harm her. She reported that 2015 these sx worsened to the point she became quite scared and left her home numerous times. She reports that in one instance she returned home, felt someone was breaking into her house and she was about to jump from her balcony for safety but did not do so because of the window guards. She reports that she was brought to the hospital by fellow Jain members, was placed in obs and dischaged with medicaition. She reports she tried at least 2 medication in outpatient (reports 1 caused a rash and then was placed on paroxetine and titrated to 40mg). She reports paroxetine helped her have improvement in voices and depression but reports there was never remission of AH. She reports that on one instance she was unable to reschedule a session as her clinic did not  and therefore she continued to have her PMD write her paroxetine until he passed away himself. She reports she has tried to look for psychaitric help for 3 + years to no avail. She reports that in the interm her  passed from a heart attack in 2019 and her son from convulsions in 2020 which has lead her to have return of depressive sx including poor sleep, anhedonia, poor energy and appetite as well as return of AH and paranoia. She reports it is not as bad as it had been years ago but she reports she hears muffled speaking, voices blaming her for something and reports that she feels she is being watched outside. She denied visual hallucinations. She reported some passive SI of not wishing to be alive due to how she feels but denied si and reported that it is against her Shinto (jahova's witness). She reported some fear of being followed but did not report overt anxiety. Patient denied hx of hayden or manic like sx.   Treatment options were discussed with her and after discussing risk, benefits and alternatives, including restarting paroxetine, patient wished to try sertraline and try trazadone for sleep.  [FreeTextEntry2] : Christine last received psychiatric treatment in 2018 at [facility]'s outpatient mental health program. She engaged in a few therapy sessions and was prescribed Paxil for her symptoms. However, Christine did not like it there, and has since been unmedicated.  [FreeTextEntry3] : reported took medication but reports she got an allergice reaction to it and could not name the medication.  Paxil - 2018 - Christine reports was unable to take medication in > 3 years after she was unable to follow up with [facility] and her pmd stopped prescribing paxil  tried abilify dec 2023: copious vomiting

## 2024-02-05 NOTE — DISCUSSION/SUMMARY
[FreeTextEntry1] : Patient is a 62 yo f, , domiciled in private residence with her adult son, emigrated from [place] in 1995, currently on disability, pmh of HLD, PPH of depression with psychotic features previously but not previous psychiatric hospitalizations and has been without psychiatric care for ~ 5 years. On evaluation patient reports a history of sever MDD with psychotic features which previously responded to SSRIs and worsened after she could no longer medications. She now presents with exacerbation with depressive symptoms with passive once more as well as recurrence of psychotic symptoms. After discussing treatment options including risk, benefits and alternatives, patient was agreeable to trial of sertraline as well as trazadone. Patient presents today for follow up.   Today patient reports increase of risperidone did not agree with her, reporting that she brought herself back down to risperidone 0.5mg bid. Patient reports that her symptoms remained stable. We discussed treatment options and patient requested to remain on current dosage of medicaiton and to continued observation. Patient denied acute concerns at this time, denied si/hi, was linear in thought, not manic or psychotic during encounter.   Risk factors of MDD with psychotic features is largely mitigated by her help seeking behavior, her previous response to medications, her strong support system, her engagement with relgion and her denial of active si/hi.   plan:  - continue sertraline 100mg po daily - decreased risperdone to 0.5mg po bid - stopped abilify: patient with copious vomiting - stopped trazodone,  - patient stopped mirtazapine - RTC in 6 weeks

## 2024-02-05 NOTE — FAMILY HISTORY
[FreeTextEntry1] : Family Composition, History, and Background: Christine was born in [place] and moved to [place] in 1995. Her primary language is Guyanese. She is unable to speak English fluently. Christine has 7 sisters that live in [place]. She also has a brother who lives in [place]. Christine has a strong relationship with all of her siblings, and she can rely on them for social support, especially her sister Audrey.  Pertinent Family Medical, MH and Substance Use History including Adult Child of Alcoholic and child of substance abuse status; history of cancer and heart disease:   She mentioned that two of her sisters, one older and one younger, both experience depression. Other than this, she did not report any mental health pathology within her family. Christine lives with her son; however, she reports that he is a current stressor in her life. Christine reports that he does not work, and they do not have much contact. She stated that he stays in his room often. She reports that her son, as well as her nephew, engage in substance use. She was unsure of the substances that are used and the intensity/frequency.

## 2024-02-05 NOTE — PHYSICAL EXAM
[None] : none [Cooperative] : cooperative [Constricted] : constricted [Clear] : clear [Linear/Goal Directed] : linear/goal directed [Hallucinations - Auditory] : hallucinations - auditory [Average] : average [WNL] : within normal limits [FreeTextEntry1] : well dressed and groomed [FreeTextEntry8] : "okay" [de-identified] : Euthymic [de-identified] : denied active, reports continued intermittent AH

## 2024-02-06 DIAGNOSIS — F33.3 MAJOR DEPRESSIVE DISORDER, RECURRENT, SEVERE WITH PSYCHOTIC SYMPTOMS: ICD-10-CM

## 2024-02-27 ENCOUNTER — APPOINTMENT (OUTPATIENT)
Dept: OBGYN | Facility: CLINIC | Age: 64
End: 2024-02-27
Payer: MEDICARE

## 2024-02-27 ENCOUNTER — OUTPATIENT (OUTPATIENT)
Dept: OUTPATIENT SERVICES | Facility: HOSPITAL | Age: 64
LOS: 1 days | End: 2024-02-27
Payer: MEDICARE

## 2024-02-27 VITALS
HEIGHT: 59.5 IN | DIASTOLIC BLOOD PRESSURE: 76 MMHG | BODY MASS INDEX: 32.63 KG/M2 | SYSTOLIC BLOOD PRESSURE: 122 MMHG | WEIGHT: 164 LBS

## 2024-02-27 DIAGNOSIS — Z00.00 ENCOUNTER FOR GENERAL ADULT MEDICAL EXAMINATION WITHOUT ABNORMAL FINDINGS: ICD-10-CM

## 2024-02-27 DIAGNOSIS — R32 UNSPECIFIED URINARY INCONTINENCE: ICD-10-CM

## 2024-02-27 PROCEDURE — 81513 NFCT DS BV RNA VAG FLU ALG: CPT

## 2024-02-27 PROCEDURE — 87591 N.GONORRHOEAE DNA AMP PROB: CPT

## 2024-02-27 PROCEDURE — 87481 CANDIDA DNA AMP PROBE: CPT

## 2024-02-27 PROCEDURE — 87661 TRICHOMONAS VAGINALIS AMPLIF: CPT

## 2024-02-27 PROCEDURE — 87086 URINE CULTURE/COLONY COUNT: CPT

## 2024-02-27 PROCEDURE — 87491 CHLMYD TRACH DNA AMP PROBE: CPT

## 2024-02-27 PROCEDURE — 99213 OFFICE O/P EST LOW 20 MIN: CPT

## 2024-02-27 NOTE — DISCUSSION/SUMMARY
[FreeTextEntry1] : 62 y/o with leaking fluid, likely an episode of incontinence, now resolved - Urine culture collected - Vaginitis swab ordered - Will treat as needed but it is unlikely that there is an infectious etiology. If incontinence recurs patient can follow up, consider referral to urogyn PRN.

## 2024-02-27 NOTE — PHYSICAL EXAM
[Appropriately responsive] : appropriately responsive [Alert] : alert [No Acute Distress] : no acute distress [Soft] : soft [Non-tender] : non-tender [Non-distended] : non-distended [FreeTextEntry7] : No suprapubic tenderness [Normal] : normal [FreeTextEntry4] : No discharge

## 2024-02-27 NOTE — HISTORY OF PRESENT ILLNESS
[FreeTextEntry1] : 64 y/o postmenopausal patient who presents for evaluation. Last month patient had an episode of leaking of fluid that felt to her as if she had broken her water. She did not think it was urine. There was no odor and no color, and it went down her leg. The same thing happened two hour later. After this she had no further episodes. She called the same time it happened to make an appointment and this was the soonest she could get. She has no further complaints. Denies postmenopausal bleeding or any urinary symptoms.

## 2024-02-28 DIAGNOSIS — R32 UNSPECIFIED URINARY INCONTINENCE: ICD-10-CM

## 2024-03-03 LAB
BACTERIA UR CULT: NORMAL
BV BACTERIA RRNA VAG QL NAA+PROBE: NOT DETECTED
C GLABRATA RNA VAG QL NAA+PROBE: NOT DETECTED
C TRACH RRNA SPEC QL NAA+PROBE: NOT DETECTED
CANDIDA RRNA VAG QL PROBE: NOT DETECTED
N GONORRHOEA RRNA SPEC QL NAA+PROBE: NOT DETECTED
T VAGINALIS RRNA SPEC QL NAA+PROBE: NOT DETECTED

## 2024-03-25 ENCOUNTER — APPOINTMENT (OUTPATIENT)
Dept: PSYCHIATRY | Facility: CLINIC | Age: 64
End: 2024-03-25
Payer: MEDICARE

## 2024-03-25 ENCOUNTER — OUTPATIENT (OUTPATIENT)
Dept: OUTPATIENT SERVICES | Facility: HOSPITAL | Age: 64
LOS: 1 days | End: 2024-03-25
Payer: MEDICARE

## 2024-03-25 DIAGNOSIS — F33.3 MAJOR DEPRESSIVE DISORDER, RECURRENT, SEVERE WITH PSYCHOTIC SYMPTOMS: ICD-10-CM

## 2024-03-25 PROCEDURE — ZZZZZ: CPT

## 2024-03-25 PROCEDURE — 99214 OFFICE O/P EST MOD 30 MIN: CPT

## 2024-03-25 RX ORDER — RISPERIDONE 1 MG/1
1 TABLET, FILM COATED ORAL
Qty: 60 | Refills: 0 | Status: COMPLETED | COMMUNITY
Start: 2024-01-03 | End: 2024-03-25

## 2024-03-25 NOTE — HISTORY OF PRESENT ILLNESS
[Not Applicable] : Not applicable [FreeTextEntry1] : HPI:   Patient is a 62 yo f, , domicilied in private residence with her adult son, emmigrated from [facility] in 1995, currently on disability, pmh of HLD, PPH of depression with psychotic features previously but not previous psychiatric hospitalizations and has been without psychiatric care for ~ 5 years. Patient is referal from hospital after patient was admitted to medical unit for finger ulcer and revealed psychiatric hx. Patient presents today for intake.   On encounter patient is well dressed and groomed, however, she is notably tearful throughout interview. Patient reports today that she came to [place] and may have always had some underlying sadness and anxiety but was never in psychiatric care. She reports that in 2007 her sons started using substances which lead her to feel stressed and betrayed. She reports that things progressed and reports in 2012 she began having some notable depression, describing difficulty sleeping, anhedonia, excessive crying and sadness, poor appetite and energy. She reports that she remained with many of these sx and soon she began experiencing AH and paranoia. She reported that the voices began as muffled but then would be clear at times and tell her they were going to hurt her. She reports that she began feeling that people were trying to break into her home and harm her. She reported that 2015 these sx worsened to the point she became quite scared and left her home numerous times. She reports that in one instance she returned home, felt someone was breaking into her house and she was about to jump from her balcony for safety but did not do so because of the window guards. She reports that she was brought to the hospital by fellow [facility] members, was placed in obs and dischaged with medicaition. She reports she tried at least 2 medication in outpatient (reports 1 caused a rash and then was placed on paroxetine and titrated to 40mg). She reports paroxetine helped her have improvement in voices and depression but reports there was never remission of AH. She reports that on one instance she was unable to reschedule a session as her clinic did not  and therefore she continued to have her PMD write her paroxetine until he passed away himself. She reports she has tried to look for psychaitric help for 3 + years to no avail. She reports that in the interm her  passed from a heart attack in 2019 and her son from convulsions in 2020 which has lead her to have return of depressive sx including poor sleep, anhedonia, poor energy and appetite as well as return of AH and paranoia. She reports it is not as bad as it had been years ago but she reports she hears muffled speaking, voices blaming her for something and reports that she feels she is being watched outside. She denied visual hallucinations. She reported some passive SI of not wishing to be alive due to how she feels but denied si and reported that it is against her Amish (jahova's witness). She reported some fear of being followed but did not report overt anxiety. Patient denied hx of hayden or manic like sx.   Treatment options were discussed with her and after discussing risk, benefits and alternatives, including restarting paroxetine, patient wished to try sertraline and try trazadone for sleep.  [FreeTextEntry2] : Christine last received psychiatric treatment in 2018 at [facility]'s outpatient mental health program. She engaged in a few therapy sessions and was prescribed Paxil for her symptoms. However, Christine did not like it there, and has since been unmedicated.  [FreeTextEntry3] : reported took medication but reports she got an allergice reaction to it and could not name the medication.  Paxil - 2018 - Christine reports was unable to take medication in > 3 years after she was unable to follow up with [facility] and her pmd stopped prescribing paxil  tried abilify dec 2023: copious vomiting

## 2024-03-25 NOTE — SOCIAL HISTORY
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 She is able to support herself through disability payments each month.  Developmental hx: Christine has no history of Intellectual/Developmental Disability.  Social supports - meaningful activities: Christine enjoys cooking for leisure and spending time with her family. She has strong family support, as well as social support from her Alevism community.  Race/ethnicity, sexual identity, spirituality/Jewish: Christine identifies as a Jehova Witness. She values her friends and family, as this is something very important in her culture.  (Spiritual Assessment Tool - TWAN DE LA GARZA. What is your dakota or belief?  Christine is a Jehova Witness and she is actively involved in her Jewish. 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   Do you consider yourself spiritual or Anabaptism?    She considers herself to be highly Anabaptism and she derives strong support from her Anabaptism network.  Is there something you believe in that gives meaning to your life?  Christine places her dakota in God and believes that he is in heaven protecting her. Much of her actions/behavior is rooted in values/beliefs as a Jehovah Witness.   I: Is it important in your life?  Christine ranks her Jewish as a high priority and is something that takes up a majority of her time. 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 Anabaptism is a guiding force in her life.   What influence does it have on how you take care of yourself?  Christine noted that self-injury and suicide go against the principles in her Jewish, which influences her behavior and serves as a protective factor. She ensures that she puts family and friends on a high priority list.  How have your beliefs influenced your behavior during this illness? What role do your beliefs play in regaining your health?  Christine's beliefs serve as a protective factor against suicide.  C. Are you part of a spiritual or Anabaptism community?  Christine belongs to the [facility] where she practices her Jewish.  Is this of support to you and how?   Christine receives strong support from her peers in the Alevism.  Is there a person or group of people you really love or who are really important to you?  Christine loves her family and friends.  H. We have been discussing your belief and supports. What else gives you internal support?  Christine believes that she must live in order to support her family.  What are your sources of hope, strength, comfort and peace? []  Christine has dakota in God which gives her purpose and direction.   What do you hold on to during difficult times? Christine holds onto her belief in God during difficult times, as well as reading the bible.

## 2024-03-25 NOTE — DISCUSSION/SUMMARY
[FreeTextEntry1] : Patient is a 64 yo f, , domiciled in private residence with her adult son, emigrated from [place] in 1995, currently on disability, pmh of HLD, PPH of depression with psychotic features previously but not previous psychiatric hospitalizations and has been without psychiatric care for ~ 5 years. On evaluation patient reports a history of sever MDD with psychotic features which previously responded to SSRIs and worsened after she could no longer medications. She now presents with exacerbation with depressive symptoms with passive once more as well as recurrence of psychotic symptoms. After discussing treatment options including risk, benefits and alternatives, patient was agreeable to trial of sertraline as well as trazadone. Patient presents today for follow up.   Today patient reports some increased psychosocial stressors and reports difficulty with sleep, worsened anxiety in morning and ongoing AH. Given above, patient was offered and educated about olanzapine. Patient was agreeable to trial of olanzapine over risperidone. Patient denied acute concerns at this time, denied si/hi, was linear in thought, not manic or psychotic during encounter.   Risk factors of MDD with psychotic features is largely mitigated by her help seeking behavior, her previous response to medications, her strong support system, her engagement with relgion and her denial of active si/hi.   plan:  - continue sertraline 100mg po daily - Trial olanzapine 2.5mg  - stop risperdone - stopped abilify: patient with copious vomiting - stopped trazodone, patient said caused GERD - patient stopped mirtazapine, patient said too tired next day - RTC in 2 weeks

## 2024-03-25 NOTE — FAMILY HISTORY
Telephone Encounter by Lima Jefferson RN at 04/12/18 04:17 PM     Author:  Lima Jefferson RN Service:  (none) Author Type:  Registered Nurse     Filed:  04/12/18 04:18 PM Encounter Date:  4/12/2018 Status:  Signed     :  Lima Jefferson RN (Registered Nurse)            H&P completed and faxed to Alejandra at [facility] at number provided.[MB1.1M]       Revision History        User Key Date/Time User Provider Type Action    > MB1.1 04/12/18 04:18 PM Lima Jefferson RN Registered Nurse Sign    M - Manual             [FreeTextEntry1] : Family Composition, History, and Background: Christine was born in [place] and moved to [place] in 1995. Her primary language is Tuvaluan. She is unable to speak English fluently. Christine has 7 sisters that live in [place]. She also has a brother who lives in [place]. Christine has a strong relationship with all of her siblings, and she can rely on them for social support, especially her sister Audrey.  Pertinent Family Medical, MH and Substance Use History including Adult Child of Alcoholic and child of substance abuse status; history of cancer and heart disease:   She mentioned that two of her sisters, one older and one younger, both experience depression. Other than this, she did not report any mental health pathology within her family. Christine lives with her son; however, she reports that he is a current stressor in her life. Christine reports that he does not work, and they do not have much contact. She stated that he stays in his room often. She reports that her son, as well as her nephew, engage in substance use. She was unsure of the substances that are used and the intensity/frequency.

## 2024-03-25 NOTE — PHYSICAL EXAM
[None] : none [Cooperative] : cooperative [Constricted] : constricted [Clear] : clear [Linear/Goal Directed] : linear/goal directed [Hallucinations - Auditory] : hallucinations - auditory [Average] : average [WNL] : within normal limits [FreeTextEntry1] : well dressed and groomed [FreeTextEntry8] : "okay" [de-identified] : Euthymic [de-identified] : denied active, reports continued intermittent AH

## 2024-03-26 DIAGNOSIS — F33.3 MAJOR DEPRESSIVE DISORDER, RECURRENT, SEVERE WITH PSYCHOTIC SYMPTOMS: ICD-10-CM

## 2024-04-19 ENCOUNTER — APPOINTMENT (OUTPATIENT)
Dept: PSYCHIATRY | Facility: CLINIC | Age: 64
End: 2024-04-19
Payer: MEDICARE

## 2024-04-19 ENCOUNTER — OUTPATIENT (OUTPATIENT)
Dept: OUTPATIENT SERVICES | Facility: HOSPITAL | Age: 64
LOS: 1 days | End: 2024-04-19
Payer: MEDICARE

## 2024-04-19 DIAGNOSIS — F33.3 MAJOR DEPRESSIVE DISORDER, RECURRENT, SEVERE WITH PSYCHOTIC SYMPTOMS: ICD-10-CM

## 2024-04-19 PROCEDURE — 99214 OFFICE O/P EST MOD 30 MIN: CPT

## 2024-04-19 PROCEDURE — ZZZZZ: CPT

## 2024-04-20 DIAGNOSIS — F33.3 MAJOR DEPRESSIVE DISORDER, RECURRENT, SEVERE WITH PSYCHOTIC SYMPTOMS: ICD-10-CM

## 2024-05-10 ENCOUNTER — APPOINTMENT (OUTPATIENT)
Dept: PSYCHIATRY | Facility: CLINIC | Age: 64
End: 2024-05-10

## 2024-05-17 ENCOUNTER — OUTPATIENT (OUTPATIENT)
Dept: OUTPATIENT SERVICES | Facility: HOSPITAL | Age: 64
LOS: 1 days | End: 2024-05-17
Payer: MEDICARE

## 2024-05-17 ENCOUNTER — APPOINTMENT (OUTPATIENT)
Dept: PSYCHIATRY | Facility: CLINIC | Age: 64
End: 2024-05-17
Payer: MEDICARE

## 2024-05-17 DIAGNOSIS — F33.3 MAJOR DEPRESSIVE DISORDER, RECURRENT, SEVERE WITH PSYCHOTIC SYMPTOMS: ICD-10-CM

## 2024-05-17 PROCEDURE — 99214 OFFICE O/P EST MOD 30 MIN: CPT

## 2024-05-17 PROCEDURE — ZZZZZ: CPT

## 2024-05-17 NOTE — HISTORY OF PRESENT ILLNESS
[Not Applicable] : Not applicable [FreeTextEntry1] : HPI:   Patient is a 62 yo f, , domicilied in private residence with her adult son, emmigrated from [facility] in [place], currently on disability, pmh of HLD, PPH of depression with psychotic features previously but not previous psychiatric hospitalizations and has been without psychiatric care for ~ 5 years. Patient is referal from hospital after patient was admitted to medical unit for finger ulcer and revealed psychiatric hx. Patient presents today for intake.   On encounter patient is well dressed and groomed, however, she is notably tearful throughout interview. Patient reports today that she came to [place] in [place] and may have always had some underlying sadness and anxiety but was never in psychiatric care. She reports that in 2007 her sons started using substances which lead her to feel stressed and betrayed. She reports that things progressed and reports in 2012 she began having some notable depression, describing difficulty sleeping, anhedonia, excessive crying and sadness, poor appetite and energy. She reports that she remained with many of these sx and soon she began experiencing AH and paranoia. She reported that the voices began as muffled but then would be clear at times and tell her they were going to hurt her. She reports that she began feeling that people were trying to break into her home and harm her. She reported that 2015 these sx worsened to the point she became quite scared and left her home numerous times. She reports that in one instance she returned home, felt someone was breaking into her house and she was about to jump from her balcony for safety but did not do so because of the window guards. She reports that she was brought to the hospital by fellow [employer] members, was placed in obs and dischaged with medicaition. She reports she tried at least 2 medication in outpatient (reports 1 caused a rash and then was placed on paroxetine and titrated to 40mg). She reports paroxetine helped her have improvement in voices and depression but reports there was never remission of AH. She reports that on one instance she was unable to reschedule a session as her clinic did not  and therefore she continued to have her PMD write her paroxetine until he passed away himself. She reports she has tried to look for psychaitric help for 3 + years to no avail. She reports that in the interm her  passed from a heart attack in 2019 and her son from convulsions in 2020 which has lead her to have return of depressive sx including poor sleep, anhedonia, poor energy and appetite as well as return of AH and paranoia. She reports it is not as bad as it had been years ago but she reports she hears muffled speaking, voices blaming her for something and reports that she feels she is being watched outside. She denied visual hallucinations. She reported some passive SI of not wishing to be alive due to how she feels but denied si and reported that it is against her Buddhism (jahova's witness). She reported some fear of being followed but did not report overt anxiety. Patient denied hx of hayden or manic like sx.   Treatment options were discussed with her and after discussing risk, benefits and alternatives, including restarting paroxetine, patient wished to try sertraline and try trazadone for sleep.  [FreeTextEntry2] : Christine last received psychiatric treatment in 2018 at [facility]'s outpatient mental health program. She engaged in a few therapy sessions and was prescribed Paxil for her symptoms. However, Christine did not like it there, and has since been unmedicated.  [FreeTextEntry3] : reported took medication but reports she got an allergice reaction to it and could not name the medication.  Paxil - 2018 - Christine reports was unable to take medication in > 3 years after she was unable to follow up with [facility] and her pmd stopped prescribing paxil  tried abilify dec 2023: copious vomiting

## 2024-05-17 NOTE — SOCIAL HISTORY
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 She is able to support herself through disability payments each month.  Developmental hx: Christine has no history of Intellectual/Developmental Disability.  Social supports - meaningful activities: Christine enjoys cooking for leisure and spending time with her family. She has strong family support, as well as social support from her Jain community.  Race/ethnicity, sexual identity, spirituality/Muslim: Christine identifies as a Jehova Witness. She values her friends and family, as this is something very important in her culture.  (Spiritual Assessment Tool - TWAN DE LA GARZA. What is your dakota or belief?  Christine is a Jehova Witness and she is actively involved in her Muslim. 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   Do you consider yourself spiritual or Latter day?    She considers herself to be highly Latter day and she derives strong support from her Latter day network.  Is there something you believe in that gives meaning to your life?  Christine places her dakota in God and believes that he is in heaven protecting her. Much of her actions/behavior is rooted in values/beliefs as a Jehovah Witness.   I: Is it important in your life?  Christine ranks her Muslim as a high priority and is something that takes up a majority of her time. 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 Confucianism is a guiding force in her life.   What influence does it have on how you take care of yourself?  Christine noted that self-injury and suicide go against the principles in her Muslim, which influences her behavior and serves as a protective factor. She ensures that she puts family and friends on a high priority list.  How have your beliefs influenced your behavior during this illness? What role do your beliefs play in regaining your health?  Christine's beliefs serve as a protective factor against suicide.  C. Are you part of a spiritual or Latter day community?  Christine belongs to the [employer] [facility] where she practices her Muslim.  Is this of support to you and how?   Christine receives strong support from her peers in the Jain.  Is there a person or group of people you really love or who are really important to you?  Christine loves her family and friends.  H. We have been discussing your belief and supports. What else gives you internal support?  Christine believes that she must live in order to support her family.  What are your sources of hope, strength, comfort and peace? []  Christine has dakota in God which gives her purpose and direction.   What do you hold on to during difficult times? Christine holds onto her belief in God during difficult times, as well as reading the bible.

## 2024-05-17 NOTE — DISCUSSION/SUMMARY
[FreeTextEntry1] : Patient is a 62 yo f, , domiciled in private residence with her adult son, emigrated from [place] in 1995, currently on disability, pmh of HLD, PPH of depression with psychotic features previously but not previous psychiatric hospitalizations and has been without psychiatric care for ~ 5 years. On evaluation patient reports a history of sever MDD with psychotic features which previously responded to SSRIs and worsened after she could no longer medications. She now presents with exacerbation with depressive symptoms with passive once more as well as recurrence of psychotic symptoms. After discussing treatment options including risk, benefits and alternatives, patient was agreeable to trial of sertraline as well as trazadone. Patient presents today for follow up.   Today patient reports fair control of her depression and AH and requested to consider tapering to single medication. Patient expressed understanding of risks and benefits of reducing olanzapine and was agreeable to the same. Patient denied acute concerns at this time, denied si/hi, was linear in thought, not manic or psychotic during encounter.   Risk factors of MDD with psychotic features is largely mitigated by her help seeking behavior, her previous response to medications, her strong support system, her engagement with relgion and her denial of active si/hi.   plan:  - continue sertraline 150mg po daily - reduce olanzapine to 1/2 of 2.5mg  - stopped risperdone - stopped abilify: patient with copious vomiting - stopped trazodone, patient said caused GERD - patient stopped mirtazapine, patient said too tired next day - RTC in 3 weeks

## 2024-05-17 NOTE — PHYSICAL EXAM
[None] : none [Cooperative] : cooperative [Clear] : clear [Linear/Goal Directed] : linear/goal directed [Hallucinations - Auditory] : hallucinations - auditory [Average] : average [WNL] : within normal limits [FreeTextEntry1] : well dressed and groomed [FreeTextEntry8] : "okay" [de-identified] : Euthymic [de-identified] : denied active, reports continued intermittent AH

## 2024-05-17 NOTE — FAMILY HISTORY
[FreeTextEntry1] : Family Composition, History, and Background: Christine was born in [place] and moved to [place] in 1995. Her primary language is Tuvaluan. She is unable to speak English fluently. Christine has 7 sisters that live in [place]. She also has a brother who lives in [place]. Christine has a strong relationship with all of her siblings, and she can rely on them for social support, especially her sister Audrey.  Pertinent Family Medical, MH and Substance Use History including Adult Child of Alcoholic and child of substance abuse status; history of cancer and heart disease:   She mentioned that two of her sisters, one older and one younger, both experience depression. Other than this, she did not report any mental health pathology within her family. Christine lives with her son; however, she reports that he is a current stressor in her life. Christine reports that he does not work, and they do not have much contact. She stated that he stays in his room often. She reports that her son, as well as her nephew, engage in substance use. She was unsure of the substances that are used and the intensity/frequency.

## 2024-05-18 DIAGNOSIS — F33.3 MAJOR DEPRESSIVE DISORDER, RECURRENT, SEVERE WITH PSYCHOTIC SYMPTOMS: ICD-10-CM

## 2024-06-07 ENCOUNTER — APPOINTMENT (OUTPATIENT)
Dept: PSYCHIATRY | Facility: CLINIC | Age: 64
End: 2024-06-07
Payer: MEDICARE

## 2024-06-07 ENCOUNTER — OUTPATIENT (OUTPATIENT)
Dept: OUTPATIENT SERVICES | Facility: HOSPITAL | Age: 64
LOS: 1 days | End: 2024-06-07
Payer: MEDICARE

## 2024-06-07 DIAGNOSIS — F33.3 MAJOR DEPRESSIVE DISORDER, RECURRENT, SEVERE WITH PSYCHOTIC SYMPTOMS: ICD-10-CM

## 2024-06-07 PROCEDURE — ZZZZZ: CPT

## 2024-06-07 PROCEDURE — 99214 OFFICE O/P EST MOD 30 MIN: CPT

## 2024-06-07 RX ORDER — OLANZAPINE 2.5 MG/1
2.5 TABLET, FILM COATED ORAL DAILY
Qty: 30 | Refills: 0 | Status: COMPLETED | COMMUNITY
Start: 2024-03-25 | End: 2024-06-07

## 2024-06-07 RX ORDER — SERTRALINE HYDROCHLORIDE 100 MG/1
100 TABLET, FILM COATED ORAL DAILY
Qty: 60 | Refills: 0 | Status: ACTIVE | COMMUNITY
Start: 2023-09-13 | End: 1900-01-01

## 2024-06-07 NOTE — PHYSICAL EXAM
[None] : none [Cooperative] : cooperative [Clear] : clear [Linear/Goal Directed] : linear/goal directed [Hallucinations - Auditory] : hallucinations - auditory [Average] : average [WNL] : within normal limits [FreeTextEntry1] : well dressed and groomed [FreeTextEntry8] : "okay" [de-identified] : Euthymic [de-identified] : denied active, reports continued intermittent AH

## 2024-06-07 NOTE — FAMILY HISTORY
[FreeTextEntry1] : Family Composition, History, and Background: Christine was born in [place] and moved to [place] in 1995. Her primary language is Solomon Islander. She is unable to speak English fluently. Christine has 7 sisters that live in [place]. She also has a brother who lives in [place]. Christine has a strong relationship with all of her siblings, and she can rely on them for social support, especially her sister Audrey.  Pertinent Family Medical, MH and Substance Use History including Adult Child of Alcoholic and child of substance abuse status; history of cancer and heart disease:   She mentioned that two of her sisters, one older and one younger, both experience depression. Other than this, she did not report any mental health pathology within her family. Christine lives with her son; however, she reports that he is a current stressor in her life. Christine reports that he does not work, and they do not have much contact. She stated that he stays in his room often. She reports that her son, as well as her nephew, engage in substance use. She was unsure of the substances that are used and the intensity/frequency.

## 2024-06-07 NOTE — SOCIAL HISTORY
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 She is able to support herself through disability payments each month.  Developmental hx: Christine has no history of Intellectual/Developmental Disability.  Social supports - meaningful activities: Christine enjoys cooking for leisure and spending time with her family. She has strong family support, as well as social support from her Zoroastrianism community.  Race/ethnicity, sexual identity, spirituality/Moravian: Christine identifies as a Jehova Witness. She values her friends and family, as this is something very important in her culture.  (Spiritual Assessment Tool - TWAN DE LA GARZA. What is your dakota or belief?  Christine is a Jehova Witness and she is actively involved in her Moravian. 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   Do you consider yourself spiritual or Anabaptism?    She considers herself to be highly Anabaptism and she derives strong support from her Anabaptism network.  Is there something you believe in that gives meaning to your life?  Christine places her dakota in God and believes that he is in heaven protecting her. Much of her actions/behavior is rooted in values/beliefs as a Jehovah Witness.   I: Is it important in your life?  Christine ranks her Moravian as a high priority and is something that takes up a majority of her time. 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 Scientology is a guiding force in her life.   What influence does it have on how you take care of yourself?  Christine noted that self-injury and suicide go against the principles in her Moravian, which influences her behavior and serves as a protective factor. She ensures that she puts family and friends on a high priority list.  How have your beliefs influenced your behavior during this illness? What role do your beliefs play in regaining your health?  Christine's beliefs serve as a protective factor against suicide.  C. Are you part of a spiritual or Anabaptism community?  Christine belongs to the [facility] where she practices her Moravian.  Is this of support to you and how?   Christine receives strong support from her peers in the Zoroastrianism.  Is there a person or group of people you really love or who are really important to you?  Christine loves her family and friends.  H. We have been discussing your belief and supports. What else gives you internal support?  Christine believes that she must live in order to support her family.  What are your sources of hope, strength, comfort and peace? []  Christine has dakota in God which gives her purpose and direction.   What do you hold on to during difficult times? Christine holds onto her belief in God during difficult times, as well as reading the bible.

## 2024-06-07 NOTE — DISCUSSION/SUMMARY
[FreeTextEntry1] : Patient is a 62 yo f, , domiciled in private residence with her adult son, emigrated from [place] in 1995, currently on disability, pmh of HLD, PPH of depression with psychotic features previously but not previous psychiatric hospitalizations and has been without psychiatric care for ~ 5 years. On evaluation patient reports a history of sever MDD with psychotic features which previously responded to SSRIs and worsened after she could no longer medications. She now presents with exacerbation with depressive symptoms with passive once more as well as recurrence of psychotic symptoms. After discussing treatment options including risk, benefits and alternatives, patient was agreeable to trial of sertraline as well as trazadone. Patient presents today for follow up.   Today patient reports fair control of her depression and reported no worsening of her AH or worsening of fear with the decrease of olanzapine. Patient reprots she continues to feel low at times and understands that a large influence on her mood is her current social stressor. Patient requested further decrease of olanzapine and understood risks and benefits. Given ongoing mood symptoms, patient was agreeable to further optimization of sertraline. Patient denied acute concerns at this time, denied si/hi, was linear in thought, not manic or psychotic during encounter.   Risk factors of MDD with psychotic features is largely mitigated by her help seeking behavior, her previous response to medications, her strong support system, her engagement with relgion and her denial of active si/hi.   plan:  - increase sertraline to 200mg po daily - stopped olanzapine - stopped abilify: patient with copious vomiting, stopped risperidone due to limited benefit, stopped olanzpine at pt request  - stopped trazodone, patient said caused GERD - patient stopped mirtazapine, patient said too tired next day - RTC in 3 weeks

## 2024-06-07 NOTE — HISTORY OF PRESENT ILLNESS
[Not Applicable] : Not applicable [FreeTextEntry1] : HPI:   Patient is a 62 yo f, , domicilied in private residence with her adult son, emmigrated from [facility] in 1995, currently on disability, pmh of HLD, PPH of depression with psychotic features previously but not previous psychiatric hospitalizations and has been without psychiatric care for ~ 5 years. Patient is referal from hospital after patient was admitted to medical unit for finger ulcer and revealed psychiatric hx. Patient presents today for intake.   On encounter patient is well dressed and groomed, however, she is notably tearful throughout interview. Patient reports today that she came to [place] in [place] and may have always had some underlying sadness and anxiety but was never in psychiatric care. She reports that in 2007 her sons started using substances which lead her to feel stressed and betrayed. She reports that things progressed and reports in 2012 she began having some notable depression, describing difficulty sleeping, anhedonia, excessive crying and sadness, poor appetite and energy. She reports that she remained with many of these sx and soon she began experiencing AH and paranoia. She reported that the voices began as muffled but then would be clear at times and tell her they were going to hurt her. She reports that she began feeling that people were trying to break into her home and harm her. She reported that 2015 these sx worsened to the point she became quite scared and left her home numerous times. She reports that in one instance she returned home, felt someone was breaking into her house and she was about to jump from her balcony for safety but did not do so because of the window guards. She reports that she was brought to the hospital by fellow Yazdanism members, was placed in obs and dischaged with medicaition. She reports she tried at least 2 medication in outpatient (reports 1 caused a rash and then was placed on paroxetine and titrated to 40mg). She reports paroxetine helped her have improvement in voices and depression but reports there was never remission of AH. She reports that on one instance she was unable to reschedule a session as her clinic did not  and therefore she continued to have her PMD write her paroxetine until he passed away himself. She reports she has tried to look for psychaitric help for 3 + years to no avail. She reports that in the interm her  passed from a heart attack in 2019 and her son from convulsions in 2020 which has lead her to have return of depressive sx including poor sleep, anhedonia, poor energy and appetite as well as return of AH and paranoia. She reports it is not as bad as it had been years ago but she reports she hears muffled speaking, voices blaming her for something and reports that she feels she is being watched outside. She denied visual hallucinations. She reported some passive SI of not wishing to be alive due to how she feels but denied si and reported that it is against her Scientologist (jahova's witness). She reported some fear of being followed but did not report overt anxiety. Patient denied hx of hayden or manic like sx.   Treatment options were discussed with her and after discussing risk, benefits and alternatives, including restarting paroxetine, patient wished to try sertraline and try trazadone for sleep.  [FreeTextEntry2] : Christine last received psychiatric treatment in 2018 at [facility]'s outpatient mental health program. She engaged in a few therapy sessions and was prescribed Paxil for her symptoms. However, Christine did not like it there, and has since been unmedicated.  [FreeTextEntry3] : reported took medication but reports she got an allergice reaction to it and could not name the medication.  Paxil - 2018 - Christine reports was unable to take medication in > 3 years after she was unable to follow up with [facility] and her pmd stopped prescribing paxil  tried abilify dec 2023: copious vomiting

## 2024-06-08 DIAGNOSIS — F33.3 MAJOR DEPRESSIVE DISORDER, RECURRENT, SEVERE WITH PSYCHOTIC SYMPTOMS: ICD-10-CM

## 2024-06-10 ENCOUNTER — NON-APPOINTMENT (OUTPATIENT)
Age: 64
End: 2024-06-10

## 2024-07-02 ENCOUNTER — OUTPATIENT (OUTPATIENT)
Dept: OUTPATIENT SERVICES | Facility: HOSPITAL | Age: 64
LOS: 1 days | End: 2024-07-02
Payer: MEDICARE

## 2024-07-02 ENCOUNTER — APPOINTMENT (OUTPATIENT)
Dept: OPHTHALMOLOGY | Facility: CLINIC | Age: 64
End: 2024-07-02
Payer: MEDICARE

## 2024-07-02 DIAGNOSIS — H53.8 OTHER VISUAL DISTURBANCES: ICD-10-CM

## 2024-07-02 PROCEDURE — 92020 GONIOSCOPY: CPT

## 2024-07-02 PROCEDURE — 92012 INTRM OPH EXAM EST PATIENT: CPT

## 2024-07-02 PROCEDURE — 92133 CPTRZD OPH DX IMG PST SGM ON: CPT

## 2024-07-02 PROCEDURE — 92133 CPTRZD OPH DX IMG PST SGM ON: CPT | Mod: 26

## 2024-07-10 ENCOUNTER — APPOINTMENT (OUTPATIENT)
Dept: PSYCHIATRY | Facility: CLINIC | Age: 64
End: 2024-07-10

## 2024-07-17 ENCOUNTER — NON-APPOINTMENT (OUTPATIENT)
Age: 64
End: 2024-07-17

## 2024-07-17 ENCOUNTER — APPOINTMENT (OUTPATIENT)
Dept: PSYCHIATRY | Facility: CLINIC | Age: 64
End: 2024-07-17

## 2024-07-25 NOTE — DISCUSSION/SUMMARY
[FreeTextEntry1] : Language lines #[id number] used throughout phone call (Cameroonian). Writer was able to reschedule appointment, medication was refilled after ensuring patient has not had a allergic reaction to medication sertraline, the patient endorses she has chronic numbness in legs.

## 2024-08-06 ENCOUNTER — OUTPATIENT (OUTPATIENT)
Dept: OUTPATIENT SERVICES | Facility: HOSPITAL | Age: 64
LOS: 1 days | End: 2024-08-06
Payer: MEDICARE

## 2024-08-06 ENCOUNTER — APPOINTMENT (OUTPATIENT)
Dept: PSYCHIATRY | Facility: CLINIC | Age: 64
End: 2024-08-06

## 2024-08-06 DIAGNOSIS — F33.0 MAJOR DEPRESSIVE DISORDER, RECURRENT, MILD: ICD-10-CM

## 2024-08-06 DIAGNOSIS — F41.1 GENERALIZED ANXIETY DISORDER: ICD-10-CM

## 2024-08-06 PROCEDURE — ZZZZZ: CPT

## 2024-08-06 PROCEDURE — 99213 OFFICE O/P EST LOW 20 MIN: CPT

## 2024-08-06 NOTE — SOCIAL HISTORY
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 She is able to support herself through disability payments each month.  Developmental hx: Christine has no history of Intellectual/Developmental Disability.  Social supports - meaningful activities: Christine enjoys cooking for leisure and spending time with her family. She has strong family support, as well as social support from her Mu-ism community.  Race/ethnicity, sexual identity, spirituality/Pentecostal: Christine identifies as a Jehova Witness. She values her friends and family, as this is something very important in her culture.  (Spiritual Assessment Tool - TWAN DE LA GARZA. What is your dakota or belief?  Christine is a Jehova Witness and she is actively involved in her Pentecostal. 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   Do you consider yourself spiritual or Orthodoxy?    She considers herself to be highly Orthodoxy and she derives strong support from her Orthodoxy network.  Is there something you believe in that gives meaning to your life?  Christine places her dakota in God and believes that he is in heaven protecting her. Much of her actions/behavior is rooted in values/beliefs as a Jehovah Witness.   I: Is it important in your life?  Christine ranks her Pentecostal as a high priority and is something that takes up a majority of her time. 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 Restoration is a guiding force in her life.   What influence does it have on how you take care of yourself?  Christine noted that self-injury and suicide go against the principles in her Pentecostal, which influences her behavior and serves as a protective factor. She ensures that she puts family and friends on a high priority list.  How have your beliefs influenced your behavior during this illness? What role do your beliefs play in regaining your health?  Christine's beliefs serve as a protective factor against suicide.  C. Are you part of a spiritual or Orthodoxy community?  Christine belongs to the [facility] where she practices her Pentecostal.  Is this of support to you and how?   Christine receives strong support from her peers in the Mu-ism.  Is there a person or group of people you really love or who are really important to you?  Christine loves her family and friends.  H. We have been discussing your belief and supports. What else gives you internal support?  Christine believes that she must live in order to support her family.  What are your sources of hope, strength, comfort and peace? []  Christine has dakota in God which gives her purpose and direction.   What do you hold on to during difficult times? Christine holds onto her belief in God during difficult times, as well as reading the bible.

## 2024-08-06 NOTE — DISCUSSION/SUMMARY
[FreeTextEntry1] : Patient is a 64 yo f, , domiciled in private residence with her adult son, emigrated from [place] in 1995, currently on disability, pmh of HLD, PPH of depression with psychotic features previously but no previous psychiatric hospitalizations. On evaluation patient reports a history of severe MDD with psychotic features which previously responded to SSRIs and worsened after non adherence to medications. She now presents with ongoing depressive symptoms and auditory hallucinations. She endorses improvement however not resolution with sertraline. Patient presents today for follow up.   Throughout evaluation, she is cooperative and calm. She endorses ongoing low mood and running communication auditory hallucinations. She denies manic symptoms. She denies visual hallucinations. She denies suicidal or homicidal ideations, intent, or plan. She is amenable to a trial of abilify 2 mg po daily, with close monitoring and after discussion of risk and benefits.   Risk factors of MDD with psychotic features is largely mitigated by her help seeking behavior, her previous response to medications, her strong support system, her engagement with Mormon and her denial of active suicidal or homicidal ideation or intent.   plan: c/w  sertraline to 200mg po daily initiate abilify 2 mg po daily  RTC in 3 weeks

## 2024-08-06 NOTE — FAMILY HISTORY
[FreeTextEntry1] : Family Composition, History, and Background: Christine was born in [place] and moved to [place] in 1995. Her primary language is Marshallese. She is unable to speak English fluently. Christine has 7 sisters that live in [place]. She also has a brother who lives in [place]. Christine has a strong relationship with all of her siblings, and she can rely on them for social support, especially her sister Audrey.  Pertinent Family Medical, MH and Substance Use History including Adult Child of Alcoholic and child of substance abuse status; history of cancer and heart disease:   She mentioned that two of her sisters, one older and one younger, both experience depression. Other than this, she did not report any mental health pathology within her family. Christine lives with her son; however, she reports that he is a current stressor in her life. Christine reports that he does not work, and they do not have much contact. She stated that he stays in his room often. She reports that her son, as well as her nephew, engage in substance use. She was unsure of the substances that are used and the intensity/frequency.

## 2024-08-06 NOTE — HISTORY OF PRESENT ILLNESS
[FreeTextEntry1] : Patient seen in person for follow up with sister  The patient is a 63-year-old  female who has past psychiatric history of major depressive disorder with psychotic features who is currently prescribed medication of sertraline 200 mg po daily with good adherence. She reports she has no side effects except occasional upset stomach. She endorses difficulty sleeping however melatonin is helpful. She has adequate appetite and cares for hygiene. Throughout evaluation, she is cooperative and calm. She endorses ongoing auditory hallucinations that are running communication type of multiple voices talking to each other. She denies any derogatory or command type auditory hallucinations. She states she has ongoing low mood and isolative behavior, stating she attempts to spend time with Christian friends however finds this difficult. She denies any manic symptoms. She denies any suicidal or homicidal ideation, intent, or plan. No visual hallucinations. She has strong familial support through her sister. She is hopeful and future oriented to feel improved. She is amendable to initiating a low dose of abilify 2 mg po daily with close monitoring after discussion of risk and benefits.    [Not Applicable] : Not applicable [FreeTextEntry2] : Christine last received psychiatric treatment in 2018 at [facility]'s outpatient mental health program. She engaged in a few therapy sessions and was prescribed Paxil for her symptoms.  [FreeTextEntry3] : reported took medication but reports she got an allergic reaction to it and could not name the medication.  Paxil - 2018 - Christine reports was unable to take medication in > 3 years after she was unable to follow up with [facility] and her pmd stopped prescribing paxil  tried abilify decanoate 2023: copious vomiting

## 2024-08-06 NOTE — SOCIAL HISTORY
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 She is able to support herself through disability payments each month.  Developmental hx: Christine has no history of Intellectual/Developmental Disability.  Social supports - meaningful activities: Christine enjoys cooking for leisure and spending time with her family. She has strong family support, as well as social support from her Yazidism community.  Race/ethnicity, sexual identity, spirituality/Methodist: Christine identifies as a Jehova Witness. She values her friends and family, as this is something very important in her culture.  (Spiritual Assessment Tool - TWAN DE LA GARZA. What is your dakota or belief?  Christine is a Jehova Witness and she is actively involved in her Methodist. 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   Do you consider yourself spiritual or Synagogue?    She considers herself to be highly Synagogue and she derives strong support from her Synagogue network.  Is there something you believe in that gives meaning to your life?  Christine places her dakota in God and believes that he is in heaven protecting her. Much of her actions/behavior is rooted in values/beliefs as a Jehovah Witness.   I: Is it important in your life?  Christine ranks her Methodist as a high priority and is something that takes up a majority of her time. 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 Anabaptist is a guiding force in her life.   What influence does it have on how you take care of yourself?  Christine noted that self-injury and suicide go against the principles in her Methodist, which influences her behavior and serves as a protective factor. She ensures that she puts family and friends on a high priority list.  How have your beliefs influenced your behavior during this illness? What role do your beliefs play in regaining your health?  Christine's beliefs serve as a protective factor against suicide.  C. Are you part of a spiritual or Synagogue community?  Christine belongs to the [employer] [facility] where she practices her Methodist.  Is this of support to you and how?   Christine receives strong support from her peers in the Yazidism.  Is there a person or group of people you really love or who are really important to you?  Christine loves her family and friends.  H. We have been discussing your belief and supports. What else gives you internal support?  Christine believes that she must live in order to support her family.  What are your sources of hope, strength, comfort and peace? []  Christine has dakota in God which gives her purpose and direction.   What do you hold on to during difficult times? Christine holds onto her belief in God during difficult times, as well as reading the bible.

## 2024-08-06 NOTE — PHYSICAL EXAM
[None] : none [Well groomed] : well groomed [Cooperative] : cooperative [Clear] : clear [Linear/Goal Directed] : linear/goal directed [Hallucinations - Auditory] : hallucinations - auditory [Average] : average [WNL] : within normal limits [FreeTextEntry8] : "okay" [de-identified] : Euthymic [de-identified] : running communication AH

## 2024-08-06 NOTE — HISTORY OF PRESENT ILLNESS
[FreeTextEntry1] : Patient seen in person for follow up with sister.  The patient is a 63-year-old  female who has past psychiatric history of major depressive disorder with psychotic features who is currently prescribed medication of sertraline 200 mg po daily with good adherence. She reports she has no side effects except occasional upset stomach. She endorses difficulty sleeping however melatonin is helpful. She has adequate appetite and cares for hygiene. Throughout evaluation, she is cooperative and calm. She endorses ongoing auditory hallucinations that are running communication type of multiple voices talking to each other. She denies any derogatory or command type auditory hallucinations. She states she has ongoing low mood and isolative behavior, stating she attempts to spend time with Mu-ism friends however finds this difficult. She denies any manic symptoms. She denies any suicidal or homicidal ideation, intent, or plan. No visual hallucinations. She has strong familial support through her sister. She is hopeful and future oriented to feel improved. She is amendable to initiating a low dose of abilify 2 mg po daily with close monitoring after discussion of risk and benefits.    [Not Applicable] : Not applicable [FreeTextEntry2] : Christine last received psychiatric treatment in 2018 at [facility]'s outpatient mental health program. She engaged in a few therapy sessions and was prescribed Paxil for her symptoms.  [FreeTextEntry3] : reported took medication but reports she got an allergic reaction to it and could not name the medication.  Paxil - 2018 - Christine reports was unable to take medication in > 3 years after she was unable to follow up with [facility] and her pmd stopped prescribing paxil  tried abilify decanoate 2023: copious vomiting

## 2024-08-06 NOTE — FAMILY HISTORY
[FreeTextEntry1] : Family Composition, History, and Background: Christine was born in [place] and moved to [place] in 1995. Her primary language is Sammarinese. She is unable to speak English fluently. Christine has 7 sisters that live in [place]. She also has a brother who lives in [place]. Christine has a strong relationship with all of her siblings, and she can rely on them for social support, especially her sister Audrey.  Pertinent Family Medical, MH and Substance Use History including Adult Child of Alcoholic and child of substance abuse status; history of cancer and heart disease:   She mentioned that two of her sisters, one older and one younger, both experience depression. Other than this, she did not report any mental health pathology within her family. Christine lives with her son; however, she reports that he is a current stressor in her life. Christine reports that he does not work, and they do not have much contact. She stated that he stays in his room often. She reports that her son, as well as her nephew, engage in substance use. She was unsure of the substances that are used and the intensity/frequency.

## 2024-08-06 NOTE — DISCUSSION/SUMMARY
[FreeTextEntry1] : Patient is a 64 yo f, , domiciled in private residence with her adult son, emigrated from [place] in 1995, currently on disability, pmh of HLD, PPH of depression with psychotic features previously but no previous psychiatric hospitalizations. On evaluation patient reports a history of severe MDD with psychotic features which previously responded to SSRIs and worsened after non adherence to medications. She now presents with ongoing depressive symptoms and auditory hallucinations. She endorses improvement however not resolution with sertraline. Patient presents today for follow up.   Throughout evaluation, she is cooperative and calm. She endorses ongoing low mood and running communication auditory hallucinations. She denies manic symptoms. She denies visual hallucinations. She denies suicidal or homicidal ideations, intent, or plan. She is amenable to a trial of abilify 2 mg po daily, with close monitoring and after discussion of risk and benefits.   Risk factors of MDD with psychotic features is largely mitigated by her help seeking behavior, her previous response to medications, her strong support system, her engagement with Islam and her denial of active suicidal or homicidal ideation or intent.   plan: c/w  sertraline to 200mg po daily initiate abilify 2 mg po daily  RTC in 3 weeks

## 2024-08-06 NOTE — PHYSICAL EXAM
[None] : none [Well groomed] : well groomed [Cooperative] : cooperative [Clear] : clear [Linear/Goal Directed] : linear/goal directed [Hallucinations - Auditory] : hallucinations - auditory [Average] : average [WNL] : within normal limits [FreeTextEntry8] : "okay" [de-identified] : Euthymic [de-identified] : running communication AH

## 2024-08-07 DIAGNOSIS — F33.0 MAJOR DEPRESSIVE DISORDER, RECURRENT, MILD: ICD-10-CM

## 2024-08-23 ENCOUNTER — OUTPATIENT (OUTPATIENT)
Dept: OUTPATIENT SERVICES | Facility: HOSPITAL | Age: 64
LOS: 1 days | End: 2024-08-23
Payer: MEDICARE

## 2024-08-23 ENCOUNTER — APPOINTMENT (OUTPATIENT)
Dept: OBGYN | Facility: CLINIC | Age: 64
End: 2024-08-23
Payer: MEDICARE

## 2024-08-23 VITALS
WEIGHT: 159 LBS | DIASTOLIC BLOOD PRESSURE: 80 MMHG | HEIGHT: 59.5 IN | SYSTOLIC BLOOD PRESSURE: 100 MMHG | BODY MASS INDEX: 31.63 KG/M2

## 2024-08-23 DIAGNOSIS — Z01.419 ENCOUNTER FOR GYNECOLOGICAL EXAMINATION (GENERAL) (ROUTINE) W/OUT ABNORMAL FINDINGS: ICD-10-CM

## 2024-08-23 DIAGNOSIS — Z01.419 ENCOUNTER FOR GYNECOLOGICAL EXAMINATION (GENERAL) (ROUTINE) WITHOUT ABNORMAL FINDINGS: ICD-10-CM

## 2024-08-23 PROCEDURE — 99396 PREV VISIT EST AGE 40-64: CPT

## 2024-08-23 PROCEDURE — 99459 PELVIC EXAMINATION: CPT

## 2024-08-23 RX ORDER — CLOTRIMAZOLE AND BETAMETHASONE DIPROPIONATE 10; .5 MG/G; MG/G
1-0.05 CREAM TOPICAL TWICE DAILY
Qty: 1 | Refills: 2 | Status: ACTIVE | COMMUNITY
Start: 2024-08-23 | End: 1900-01-01

## 2024-08-26 DIAGNOSIS — Z01.419 ENCOUNTER FOR GYNECOLOGICAL EXAMINATION (GENERAL) (ROUTINE) WITHOUT ABNORMAL FINDINGS: ICD-10-CM

## 2024-08-27 ENCOUNTER — OUTPATIENT (OUTPATIENT)
Dept: OUTPATIENT SERVICES | Facility: HOSPITAL | Age: 64
LOS: 1 days | End: 2024-08-27
Payer: MEDICARE

## 2024-08-27 ENCOUNTER — APPOINTMENT (OUTPATIENT)
Dept: PSYCHIATRY | Facility: CLINIC | Age: 64
End: 2024-08-27
Payer: MEDICARE

## 2024-08-27 DIAGNOSIS — F33.0 MAJOR DEPRESSIVE DISORDER, RECURRENT, MILD: ICD-10-CM

## 2024-08-27 DIAGNOSIS — F33.3 MAJOR DEPRESSIVE DISORDER, RECURRENT, SEVERE WITH PSYCHOTIC SYMPTOMS: ICD-10-CM

## 2024-08-27 PROCEDURE — 99214 OFFICE O/P EST MOD 30 MIN: CPT

## 2024-08-27 PROCEDURE — ZZZZZ: CPT

## 2024-08-27 RX ORDER — HALOPERIDOL 2 MG/1
2 TABLET ORAL DAILY
Qty: 30 | Refills: 0 | Status: ACTIVE | COMMUNITY
Start: 2024-08-27 | End: 1900-01-01

## 2024-08-27 NOTE — HISTORY OF PRESENT ILLNESS
[FreeTextEntry1] : Patient seen in person for follow up with sister  The patient is a 63-year-old  female who has past psychiatric history of major depressive disorder with psychotic features who is currently prescribed medication of sertraline 200 mg po daily with good adherence. She has no reported side effects this month. She was to trial a low dose of abilify however endorses she did not due to previous side effect of nausea.  She states she has continued running communication auditory hallucinations and paranoia that individuals are following her. She continues to deny these voices be commanding or derogatory in type. She is amenable of a low dose of haldol trial after discussion of risk and benefits. Throughout evaluation, she is cooperative and calm. She states improvement in depressive symptoms. She continues to have difficulty sleeping due to the voices however her appetite is at baseline. She denies any manic symptoms. She denies any suicidal or homicidal ideation, intent, or plan. No visual hallucinations. She has strong familial support through her sister. She is hopeful and future oriented to feel improved.  [Not Applicable] : Not applicable [FreeTextEntry2] : [place] last received psychiatric treatment in 2018 at [facility] outpatient mental health program. She engaged in a few therapy sessions and was prescribed Paxil for her symptoms.  [FreeTextEntry3] : She has trials of paxil with poor effect, risperidone and olanzapine with poor effect, abilify with side effect of nausea, trazodone with poor effect and mirtazapine with poor effect.

## 2024-08-27 NOTE — DISCUSSION/SUMMARY
[FreeTextEntry1] : Patient is a 64 yo f, , domiciled in private residence with her adult son, emigrated from [place] in 1995, currently on disability, pmh of HLD, PPH of depression with psychotic features previously but no previous psychiatric hospitalizations. On evaluation patient reports a history of severe MDD with psychotic features which previously responded to SSRIs and worsened after non adherence to medications. She now presents with improvement in depressive symptoms however ongoing auditory hallucinations that are running communication in type. Patient presents today for follow up.   Throughout evaluation, she is cooperative and calm. She endorses running communication auditory hallucinations. She denies manic or depressive symptoms. She denies visual hallucinations. She denies suicidal or homicidal ideations, intent, or plan. She is amenable to a trial of haldol 2 mg po daily, with close monitoring and after discussion of risk and benefits.   Risk factors of MDD with psychotic features is largely mitigated by her help seeking behavior, her previous response to medications, her strong support system, her engagement with Caodaism and her denial of active suicidal or homicidal ideation or intent.   plan: c/w  sertraline to 200mg po daily initiate haldol 2 mg po daily  RTC in 4 weeks  Medication sent to pharmacy for thirty days with no refill

## 2024-08-27 NOTE — PHYSICAL EXAM
[None] : none [Well groomed] : well groomed [Cooperative] : cooperative [Clear] : clear [Linear/Goal Directed] : linear/goal directed [Hallucinations - Auditory] : hallucinations - auditory [Average] : average [WNL] : within normal limits [FreeTextEntry8] : "okay" [de-identified] : Euthymic [de-identified] : running communication AH

## 2024-08-27 NOTE — FAMILY HISTORY
[FreeTextEntry1] : Family Composition, History, and Background: Christine was born in [place] and moved to [place] in 1995. Her primary language is Kosovan. She is unable to speak English fluently. Christine has 7 sisters that live in [place]. She also has a brother who lives in [place]. Christine has a strong relationship with all of her siblings, and she can rely on them for social support, especially her sister Audrey.  Pertinent Family Medical, MH and Substance Use History including Adult Child of Alcoholic and child of substance abuse status; history of cancer and heart disease:   She mentioned that two of her sisters, one older and one younger, both experience depression. Other than this, she did not report any mental health pathology within her family. Christine lives with her son; however, she reports that he is a current stressor in her life. Christine reports that he does not work, and they do not have much contact. She stated that he stays in his room often. She reports that her son, as well as her nephew, engage in substance use. She was unsure of the substances that are used and the intensity/frequency.

## 2024-08-27 NOTE — SOCIAL HISTORY
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 She is able to support herself through disability payments each month.  Developmental hx: Christine has no history of Intellectual/Developmental Disability.  Social supports - meaningful activities: Christine enjoys cooking for leisure and spending time with her family. She has strong family support, as well as social support from her Islam community.  Race/ethnicity, sexual identity, spirituality/Pentecostal: Christine identifies as a Jehova Witness. She values her friends and family, as this is something very important in her culture.  (Spiritual Assessment Tool - TWAN DE LA GARZA. What is your dakota or belief?  Christine is a Jehova Witness and she is actively involved in her Pentecostal. 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   Do you consider yourself spiritual or Jew?    She considers herself to be highly Jew and she derives strong support from her Jew network.  Is there something you believe in that gives meaning to your life?  Christine places her dakota in God and believes that he is in heaven protecting her. Much of her actions/behavior is rooted in values/beliefs as a Jehovah Witness.   I: Is it important in your life?  Christine ranks her Pentecostal as a high priority and is something that takes up a majority of her time. 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 Christianity is a guiding force in her life.   What influence does it have on how you take care of yourself?  Christine noted that self-injury and suicide go against the principles in her Pentecostal, which influences her behavior and serves as a protective factor. She ensures that she puts family and friends on a high priority list.  How have your beliefs influenced your behavior during this illness? What role do your beliefs play in regaining your health?  Christine's beliefs serve as a protective factor against suicide.  C. Are you part of a spiritual or Jew community?  Christine belongs to the [employer] [facility] where she practices her Pentecostal.  Is this of support to you and how?   Christine receives strong support from her peers in the Islam.  Is there a person or group of people you really love or who are really important to you?  Christine loves her family and friends.  H. We have been discussing your belief and supports. What else gives you internal support?  Christine believes that she must live in order to support her family.  What are your sources of hope, strength, comfort and peace? []  Christine has dakota in God which gives her purpose and direction.   What do you hold on to during difficult times? Christine holds onto her belief in God during difficult times, as well as reading the bible.

## 2024-08-27 NOTE — REASON FOR VISIT
[Patient] : Patient [FreeTextEntry1] : Med management of depression Language lines solution #[id number] used throughout interview (Pashto)

## 2024-08-28 DIAGNOSIS — F33.3 MAJOR DEPRESSIVE DISORDER, RECURRENT, SEVERE WITH PSYCHOTIC SYMPTOMS: ICD-10-CM

## 2024-08-30 ENCOUNTER — RESULT REVIEW (OUTPATIENT)
Age: 64
End: 2024-08-30

## 2024-08-30 ENCOUNTER — OUTPATIENT (OUTPATIENT)
Dept: OUTPATIENT SERVICES | Facility: HOSPITAL | Age: 64
LOS: 1 days | End: 2024-08-30
Payer: MEDICARE

## 2024-08-30 DIAGNOSIS — R92.8 OTHER ABNORMAL AND INCONCLUSIVE FINDINGS ON DIAGNOSTIC IMAGING OF BREAST: ICD-10-CM

## 2024-08-30 DIAGNOSIS — Z12.31 ENCOUNTER FOR SCREENING MAMMOGRAM FOR MALIGNANT NEOPLASM OF BREAST: ICD-10-CM

## 2024-08-30 PROCEDURE — 77066 DX MAMMO INCL CAD BI: CPT | Mod: 26

## 2024-08-30 PROCEDURE — 76641 ULTRASOUND BREAST COMPLETE: CPT | Mod: 26,LT

## 2024-08-30 PROCEDURE — G0279: CPT

## 2024-08-30 PROCEDURE — 77066 DX MAMMO INCL CAD BI: CPT

## 2024-08-30 PROCEDURE — G0279: CPT | Mod: 26

## 2024-08-30 PROCEDURE — 76641 ULTRASOUND BREAST COMPLETE: CPT | Mod: LT

## 2024-08-31 DIAGNOSIS — R92.8 OTHER ABNORMAL AND INCONCLUSIVE FINDINGS ON DIAGNOSTIC IMAGING OF BREAST: ICD-10-CM

## 2024-09-24 ENCOUNTER — APPOINTMENT (OUTPATIENT)
Dept: PSYCHIATRY | Facility: CLINIC | Age: 64
End: 2024-09-24
Payer: MEDICARE

## 2024-09-24 ENCOUNTER — OUTPATIENT (OUTPATIENT)
Dept: OUTPATIENT SERVICES | Facility: HOSPITAL | Age: 64
LOS: 1 days | End: 2024-09-24
Payer: MEDICARE

## 2024-09-24 DIAGNOSIS — F33.3 MAJOR DEPRESSIVE DISORDER, RECURRENT, SEVERE WITH PSYCHOTIC SYMPTOMS: ICD-10-CM

## 2024-09-24 PROCEDURE — ZZZZZ: CPT

## 2024-09-24 PROCEDURE — 99213 OFFICE O/P EST LOW 20 MIN: CPT

## 2024-09-24 NOTE — FAMILY HISTORY
[FreeTextEntry1] : Family Composition, History, and Background: Christine was born in [place] and moved to [place] in 1995. Her primary language is Cymro. She is unable to speak English fluently. Christine has 7 sisters that live in [place]. She also has a brother who lives in [place]. Christine has a strong relationship with all of her siblings, and she can rely on them for social support, especially her sister Audrey.  Pertinent Family Medical, MH and Substance Use History including Adult Child of Alcoholic and child of substance abuse status; history of cancer and heart disease:   She mentioned that two of her sisters, one older and one younger, both experience depression. Other than this, she did not report any mental health pathology within her family. Christine lives with her son; however, she reports that he is a current stressor in her life. Christine reports that he does not work, and they do not have much contact. She stated that he stays in his room often. She reports that her son, as well as her nephew, engage in substance use. She was unsure of the substances that are used and the intensity/frequency.

## 2024-09-24 NOTE — PHYSICAL EXAM
[None] : none [Well groomed] : well groomed [Cooperative] : cooperative [Clear] : clear [Linear/Goal Directed] : linear/goal directed [Hallucinations - Auditory] : hallucinations - auditory [Average] : average [WNL] : within normal limits [FreeTextEntry8] : "good" [de-identified] : Euthymic [de-identified] : running communication AH

## 2024-09-24 NOTE — HISTORY OF PRESENT ILLNESS
[FreeTextEntry1] : Patient seen in person for follow up with sister  The patient is a 63-year-old  female who has past psychiatric history of major depressive disorder with psychotic features who is currently prescribed medication of sertraline 200 mg po daily and haldol 2 mg po daily with adherence of zoloft however patient only took one day of haldol. She endorses the haldol caused tiredness throughout the day, she was educated to take the medication at night as this medication may be helpful to alleviate the running communication auditory hallucinations. She is amenable to this solution as she endorses continued aforementioned AH. Throughout evaluation, she is cooperative and calm. She states improvement in depressive symptoms. She has appetite at baseline however intermittent difficulties sleeping. She denies any manic or anxiety symptoms. She denies any suicidal or homicidal ideation, intent, or plan. No visual hallucinations. She has strong familial support through her sister. She is hopeful and future oriented to feel improved.  [Not Applicable] : Not applicable [FreeTextEntry2] : Christine last received psychiatric treatment in 2018 at [facility] outpatient mental health program. She engaged in a few therapy sessions and was prescribed Paxil for her symptoms.  [FreeTextEntry3] : She has trials of paxil with poor effect, risperidone and olanzapine with poor effect, abilify with side effect of nausea, trazodone with poor effect and mirtazapine with poor effect.

## 2024-09-24 NOTE — SOCIAL HISTORY
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 She is able to support herself through disability payments each month.  Developmental hx: Christine has no history of Intellectual/Developmental Disability.  Social supports - meaningful activities: Christine enjoys cooking for leisure and spending time with her family. She has strong family support, as well as social support from her Adventism community.  Race/ethnicity, sexual identity, spirituality/Protestant: Christine identifies as a Jehova Witness. She values her friends and family, as this is something very important in her culture.  (Spiritual Assessment Tool - TWAN [employer]. What is your dakota or belief?  Christine is a Jehova Witness and she is actively involved in her Protestant. 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   Do you consider yourself spiritual or Anabaptism?    She considers herself to be highly Anabaptism and she derives strong support from her Anabaptism network.  Is there something you believe in that gives meaning to your life?  Christine places her dakota in God and believes that he is in heaven protecting her. Much of her actions/behavior is rooted in values/beliefs as a Jehovah Witness.   I: Is it important in your life?  Christine ranks her Protestant as a high priority and is something that takes up a majority of her time. 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 Islam is a guiding force in her life.   What influence does it have on how you take care of yourself?  Christine noted that self-injury and suicide go against the principles in her Protestant, which influences her behavior and serves as a protective factor. She ensures that she puts family and friends on a high priority list.  How have your beliefs influenced your behavior during this illness? What role do your beliefs play in regaining your health?  Christine's beliefs serve as a protective factor against suicide.  C. Are you part of a spiritual or Anabaptism community?  Christine belongs to the [employer] [facility] where she practices her Protestant.  Is this of support to you and how?   Christine receives strong support from her peers in the Adventism.  Is there a person or group of people you really love or who are really important to you?  Christine loves her family and friends.  H. We have been discussing your belief and supports. What else gives you internal support?  Christine believes that she must live in order to support her family.  What are your sources of hope, strength, comfort and peace? []  Christine has dakota in God which gives her purpose and direction.   What do you hold on to during difficult times? Christine holds onto her belief in God during difficult times, as well as reading the bible.

## 2024-09-24 NOTE — DISCUSSION/SUMMARY
[FreeTextEntry1] : Patient is a 62 yo f, , domiciled in private residence with her adult son, emigrated from [place] in 1995, currently on disability, pmh of HLD, PPH of depression with psychotic features previously but no previous psychiatric hospitalizations. On evaluation patient reports a history of severe MDD with psychotic features which previously responded to SSRIs and worsened after non adherence to medications. She now presents with improvement in depressive symptoms however ongoing auditory hallucinations that are running communication in type. Patient presents today for follow up.   Throughout evaluation, she is cooperative and calm. She endorses continued running communication auditory hallucinations however did not have fair trial of haldol. She denies manic or depressive symptoms. She denies visual hallucinations. She denies suicidal or homicidal ideations, intent, or plan.  Risk factors of MDD with psychotic features is largely mitigated by her help seeking behavior, her previous response to medications, her strong support system, her engagement with Catholic and her denial of active suicidal or homicidal ideation or intent.   plan: c/w  sertraline to 200mg po daily c/w haldol 2 mg po daily  RTC in 4 weeks  Medication sent to pharmacy for thirty days with no refill

## 2024-09-25 DIAGNOSIS — F33.3 MAJOR DEPRESSIVE DISORDER, RECURRENT, SEVERE WITH PSYCHOTIC SYMPTOMS: ICD-10-CM

## 2024-10-22 ENCOUNTER — APPOINTMENT (OUTPATIENT)
Dept: PSYCHIATRY | Facility: CLINIC | Age: 64
End: 2024-10-22

## 2024-10-24 ENCOUNTER — APPOINTMENT (OUTPATIENT)
Dept: INTERVENTIONAL RADIOLOGY/VASCULAR | Facility: CLINIC | Age: 64
End: 2024-10-24

## 2024-10-24 VITALS
HEART RATE: 86 BPM | TEMPERATURE: 97.1 F | SYSTOLIC BLOOD PRESSURE: 127 MMHG | OXYGEN SATURATION: 97 % | DIASTOLIC BLOOD PRESSURE: 89 MMHG

## 2024-10-24 DIAGNOSIS — I83.819 VARICOSE VEINS OF UNSPECIFIED LOWER EXTREMITY WITH PAIN: ICD-10-CM

## 2024-11-06 ENCOUNTER — APPOINTMENT (OUTPATIENT)
Dept: CARDIOLOGY | Facility: CLINIC | Age: 64
End: 2024-11-06
Payer: MEDICARE

## 2024-11-06 PROCEDURE — 93970 EXTREMITY STUDY: CPT

## 2024-11-07 ENCOUNTER — APPOINTMENT (OUTPATIENT)
Dept: PSYCHIATRY | Facility: CLINIC | Age: 64
End: 2024-11-07
Payer: MEDICARE

## 2024-11-07 ENCOUNTER — APPOINTMENT (OUTPATIENT)
Dept: INTERVENTIONAL RADIOLOGY/VASCULAR | Facility: CLINIC | Age: 64
End: 2024-11-07
Payer: MEDICARE

## 2024-11-07 ENCOUNTER — OUTPATIENT (OUTPATIENT)
Dept: OUTPATIENT SERVICES | Facility: HOSPITAL | Age: 64
LOS: 1 days | End: 2024-11-07
Payer: MEDICARE

## 2024-11-07 VITALS
OXYGEN SATURATION: 97 % | SYSTOLIC BLOOD PRESSURE: 113 MMHG | TEMPERATURE: 97.2 F | HEART RATE: 74 BPM | DIASTOLIC BLOOD PRESSURE: 79 MMHG

## 2024-11-07 DIAGNOSIS — F33.3 MAJOR DEPRESSIVE DISORDER, RECURRENT, SEVERE WITH PSYCHOTIC SYMPTOMS: ICD-10-CM

## 2024-11-07 DIAGNOSIS — F41.1 GENERALIZED ANXIETY DISORDER: ICD-10-CM

## 2024-11-07 PROCEDURE — 99214 OFFICE O/P EST MOD 30 MIN: CPT

## 2024-11-07 PROCEDURE — ZZZZZ: CPT

## 2024-11-07 PROCEDURE — 99202 OFFICE O/P NEW SF 15 MIN: CPT

## 2024-11-07 NOTE — HISTORY OF PRESENT ILLNESS
[FreeTextEntry1] : 64-year-old female with history of bilateral lower extremity varicose veins and complaints of heaviness and cramping within the bilateral lower extremities including the calf and feet.  She has been wearing compression knee-high stockings daily with significant improvement in her symptoms.  Patient denies any previous history of DVT or lower extremity interventions.  No other complaints at this time. [de-identified] : Bilateral lower extremity venous duplex performed on 11/6/2024 demonstrating no evidence of DVT or venous insufficiency.

## 2024-11-07 NOTE — ASSESSMENT
[FreeTextEntry1] : 64-year-old female with history of bilateral lower extremity heaviness, cramping with telangiectasias.  Patient has been utilizing compression therapy during the day and removing at night with significant improvement in her symptoms.  Ultrasound of the bilateral lower extremity veins demonstrates no evidence of DVT or venous insufficiency.  Therefore continue compression therapy.  Patient was advised to contact my office if she has any further worsening of her symptoms.

## 2024-11-07 NOTE — FAMILY HISTORY
[FreeTextEntry1] : Family Composition, History, and Background: Ritu was born in [place] and moved to [place] in 1995. Her primary language is Angolan. She is unable to speak English fluently. Ritu has 7 sisters that live in [place]. She also has a brother who lives in [place]. Ritu has a strong relationship with all of her siblings, and she can rely on them for social support, especially her sister Radha.  Pertinent Family Medical, MH and Substance Use History including Adult Child of Alcoholic and child of substance abuse status; history of cancer and heart disease:   She mentioned that two of her sisters, one older and one younger, both experience depression. Other than this, she did not report any mental health pathology within her family. Ritu lives with her son; however, she reports that he is a current stressor in her life. Ritu reports that he does not work, and they do not have much contact. She stated that he stays in his room often. She reports that her son, as well as her nephew, engage in substance use. She was unsure of the substances that are used and the intensity/frequency.

## 2024-11-07 NOTE — DISCUSSION/SUMMARY
[FreeTextEntry1] : Patient is a 62 yo f, , domiciled in private residence with her adult son, emigrated from [place] in 1995, currently on disability, pmh of HLD, PPH of depression with psychotic features previously but no previous psychiatric hospitalizations. On evaluation patient reports a history of severe MDD with psychotic features which previously responded to SSRIs and worsened after non adherence to medications. She now presents with ongoing auditory hallucinations that are running communication in type. Patient presents for follow up and medication optimization.   Throughout evaluation, she is cooperative and calm. She endorses continued running communication auditory hallucinations however did not have fair trial of haldol, she was educated on importance of adherence and questions answered. She denies manic or depressive symptoms. She denies visual hallucinations. She denies suicidal or homicidal ideations, intent, or plan.  Risk factors of MDD with psychotic features is largely mitigated by her help seeking behavior, her previous response to medications, her strong support system, her engagement with Lutheran and her denial of active suicidal or homicidal ideation or intent.   plan: c/w  sertraline to 200mg po daily c/w haldol 2 mg po daily  RTC in 4 weeks  Medication sent to pharmacy for thirty days with no refill  .

## 2024-11-07 NOTE — PHYSICAL EXAM
[Alert] : alert [No Acute Distress] : no acute distress [No Respiratory Distress] : no respiratory distress [Normal Rate and Effort] : normal respiratory rhythm and effort [de-identified] : Bilateral lower extremity telangiectasias, no calf tenderness, no swelling, no lipodermatosclerosis.  Palpable palpable pedal pulses.  No evidence of active ulcerations.

## 2024-11-07 NOTE — HISTORY OF PRESENT ILLNESS
[FreeTextEntry1] : Patient seen in person for follow up with sister  The patient is a 63-year-old  female who has past psychiatric history of major depressive disorder with psychotic features who is currently prescribed medication of sertraline 200 mg po daily and haldol 2 mg po daily with adherence of zoloft however patient intermittently has been adherent with haldol. She was educated on the importance of adherence to help improve auditory hallucinations, questions regarding medication was answered, she is amenable to adhering to medication.  Throughout evaluation, she is cooperative and calm. She states improvement in depressive symptoms. She continues to have running communication auditory hallucinations that are not derogatory or commanding in type. She denies any manic, depressive, or anxiety symptoms. She denies any suicidal or homicidal ideation, intent, or plan. No visual hallucinations. She has strong familial support through her sister. She is hopeful and future oriented to feel improved.  [Not Applicable] : Not applicable [FreeTextEntry2] : Ritu last received psychiatric treatment in 2018 at [facility]'s outpatient mental health program. She engaged in a few therapy sessions and was prescribed Paxil for her symptoms.  [FreeTextEntry3] : She has trials of paxil with poor effect, risperidone and olanzapine with poor effect, abilify with side effect of nausea, trazodone with poor effect and mirtazapine with poor effect.

## 2024-11-07 NOTE — PHYSICAL EXAM
[None] : none [Well groomed] : well groomed [Cooperative] : cooperative [Clear] : clear [Linear/Goal Directed] : linear/goal directed [Hallucinations - Auditory] : hallucinations - auditory [Average] : average [WNL] : within normal limits [FreeTextEntry8] : "well" [de-identified] : Euthymic [de-identified] : running communication AH

## 2024-11-07 NOTE — SOCIAL HISTORY
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 She is able to support herself through disability payments each month.  Developmental hx: Ritu has no history of Intellectual/Developmental Disability.  Social supports - meaningful activities: Ritu enjoys cooking for leisure and spending time with her family. She has strong family support, as well as social support from her Mosque community.  Race/ethnicity, sexual identity, spirituality/Yarsani: Ritu identifies as a Jehova Witness. She values her friends and family, as this is something very important in her culture.  (Spiritual Assessment Tool - BARBARA CHING. What is your danielle or belief?  Ritu is a Jehova Witness and she is actively involved in her Yarsani. 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   Do you consider yourself spiritual or Muslim?    She considers herself to be highly Muslim and she derives strong support from her Muslim network.  Is there something you believe in that gives meaning to your life?  Ritu places her danielle in God and believes that he is in heaven protecting her. Much of her actions/behavior is rooted in values/beliefs as a Jehovah Witness.   I: Is it important in your life?  Ritu ranks her Yarsani as a high priority and is something that takes up a majority of her time. 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 Episcopalian is a guiding force in her life.   What influence does it have on how you take care of yourself?  Ritu noted that self-injury and suicide go against the principles in her Yarsani, which influences her behavior and serves as a protective factor. She ensures that she puts family and friends on a high priority list.  How have your beliefs influenced your behavior during this illness? What role do your beliefs play in regaining your health?  Ritu's beliefs serve as a protective factor against suicide.  C. Are you part of a spiritual or Muslim community?  Ritu belongs to the [facility] [facility] where she practices her Yarsani.  Is this of support to you and how?   Ritu receives strong support from her peers in the Mosque.  Is there a person or group of people you really love or who are really important to you?  Ritu loves her family and friends.  H. We have been discussing your belief and supports. What else gives you internal support?  Ritu believes that she must live in order to support her family.  What are your sources of hope, strength, comfort and peace? []  Ritu has danielle in God which gives her purpose and direction.   What do you hold on to during difficult times? Ritu holds onto her belief in God during difficult times, as well as reading the bible.

## 2024-11-08 DIAGNOSIS — F33.3 MAJOR DEPRESSIVE DISORDER, RECURRENT, SEVERE WITH PSYCHOTIC SYMPTOMS: ICD-10-CM

## 2024-11-15 ENCOUNTER — NON-APPOINTMENT (OUTPATIENT)
Age: 64
End: 2024-11-15

## 2024-11-15 NOTE — DISCUSSION/SUMMARY
[FreeTextEntry1] : Writer attempted to call patient to reschedule appointment on 12/12/24 as writer will be out of office, patient did not answer and VM not set up, will continue to attempt.

## 2024-11-22 ENCOUNTER — NON-APPOINTMENT (OUTPATIENT)
Age: 64
End: 2024-11-22

## 2024-12-03 ENCOUNTER — NON-APPOINTMENT (OUTPATIENT)
Age: 64
End: 2024-12-03

## 2024-12-03 NOTE — DISCUSSION/SUMMARY
[FreeTextEntry1] : Writer attempted to call patient to reschedule appointment on 12/12/24 as writer will be out of office, patient did not answer and VM not set up, will continue to attempt. Language lines solution [id number] used

## 2024-12-24 ENCOUNTER — APPOINTMENT (OUTPATIENT)
Dept: PSYCHIATRY | Facility: CLINIC | Age: 64
End: 2024-12-24

## 2024-12-27 ENCOUNTER — NON-APPOINTMENT (OUTPATIENT)
Age: 64
End: 2024-12-27

## 2024-12-27 ENCOUNTER — APPOINTMENT (OUTPATIENT)
Dept: PSYCHIATRY | Facility: CLINIC | Age: 64
End: 2024-12-27

## 2024-12-27 NOTE — DISCUSSION/SUMMARY
[FreeTextEntry1] : Pt cancelled appointment, appointment rescheduled for 01/02/25, sufficient medication supply until this date. Language lines solution #[id number] used

## 2025-01-02 ENCOUNTER — OUTPATIENT (OUTPATIENT)
Dept: OUTPATIENT SERVICES | Facility: HOSPITAL | Age: 65
LOS: 1 days | End: 2025-01-02
Payer: MEDICARE

## 2025-01-02 ENCOUNTER — APPOINTMENT (OUTPATIENT)
Dept: PSYCHIATRY | Facility: CLINIC | Age: 65
End: 2025-01-02
Payer: MEDICARE

## 2025-01-02 DIAGNOSIS — F33.3 MAJOR DEPRESSIVE DISORDER, RECURRENT, SEVERE WITH PSYCHOTIC SYMPTOMS: ICD-10-CM

## 2025-01-02 PROCEDURE — 99213 OFFICE O/P EST LOW 20 MIN: CPT

## 2025-01-02 PROCEDURE — ZZZZZ: CPT

## 2025-01-02 NOTE — HISTORY OF PRESENT ILLNESS
[FreeTextEntry1] : Patient seen in person for follow up with sister  The patient is a 63-year-old  female who has past psychiatric history of major depressive disorder with psychotic features who is currently prescribed medication of sertraline 200 mg po daily and haldol 2 mg po daily with adherence to the medications. She endorses mild sedation from low dose of haldol, she was educated to continue to take medication and closely monitor for improvement of side effect. The patient continues to have derogatory auditory hallucinations that are not distressing to her. Plan discussed with patient to slowly optimize haldol as she has been sensitive to other failed trials of antipsychotics. Throughout evaluation, she is cooperative and calm. The aforementioned psychotic symptoms were elicited. She denies any manic, depressive, or anxiety symptoms. She denies any suicidal or homicidal ideation, intent, or plan. No visual hallucinations. She has strong familial support through her sister. She is hopeful and future oriented to feel improved.  [Not Applicable] : Not applicable [FreeTextEntry2] : Ritu last received psychiatric treatment in 2018 at [facility]'s outpatient mental health program. She engaged in a few therapy sessions and was prescribed Paxil for her symptoms.  [FreeTextEntry3] : She has trials of paxil with poor effect, risperidone and olanzapine with poor effect, abilify with side effect of nausea, trazodone with poor effect and mirtazapine with poor effect.

## 2025-01-02 NOTE — FAMILY HISTORY
[FreeTextEntry1] : Family Composition, History, and Background: Ritu was born in [place] and moved to [place] in 1995. Her primary language is Bahraini. She is unable to speak English fluently. Ritu has 7 sisters that live in [place]. She also has a brother who lives in [place]. Ritu has a strong relationship with all of her siblings, and she can rely on them for social support, especially her sister Radha.  Pertinent Family Medical, MH and Substance Use History including Adult Child of Alcoholic and child of substance abuse status; history of cancer and heart disease:   She mentioned that two of her sisters, one older and one younger, both experience depression. Other than this, she did not report any mental health pathology within her family. Ritu lives with her son; however, she reports that he is a current stressor in her life. Ritu reports that he does not work, and they do not have much contact. She stated that he stays in his room often. She reports that her son, as well as her nephew, engage in substance use. She was unsure of the substances that are used and the intensity/frequency.

## 2025-01-02 NOTE — DISCUSSION/SUMMARY
[FreeTextEntry1] : Patient is a 64 yo f, , domiciled in private residence with her adult son, emigrated from [place] in 1995, currently on disability, pmh of HLD, PPH of depression with psychotic features previously but no previous psychiatric hospitalizations. On evaluation patient reports a history of severe MDD with psychotic features which previously responded to SSRIs and worsened after non adherence to medications. She now presents with ongoing auditory hallucinations that are intermittently derogatory in type. Patient presents for follow up and medication optimization.   Throughout evaluation, she is cooperative and calm. She endorses continued derogatory auditory hallucinations, will continue optimizing haldol as patient adjusts to medication. she was educated on importance of adherence and questions answered. She denies manic or depressive symptoms. She denies visual hallucinations. She denies suicidal or homicidal ideations, intent, or plan.  Risk factors of MDD with psychotic features is largely mitigated by her help seeking behavior, her previous response to medications, her strong support system, her engagement with Christian and her denial of active suicidal or homicidal ideation or intent.   plan: c/w  sertraline 200mg po daily c/w haldol 2 mg po daily plan to optimize RTC in 4 weeks  Medication sent to pharmacy for thirty days with no refill

## 2025-01-02 NOTE — SOCIAL HISTORY
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 She is able to support herself through disability payments each month.  Developmental hx: Ritu has no history of Intellectual/Developmental Disability.  Social supports - meaningful activities: Ritu enjoys cooking for leisure and spending time with her family. She has strong family support, as well as social support from her Presybeterian community.  Race/ethnicity, sexual identity, spirituality/Uatsdin: Ritu identifies as a Jehova Witness. She values her friends and family, as this is something very important in her culture.  (Spiritual Assessment Tool - BARBARA CHING. What is your danielle or belief?  Ritu is a Jehova Witness and she is actively involved in her Uatsdin. 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   Do you consider yourself spiritual or Methodist?    She considers herself to be highly Methodist and she derives strong support from her Methodist network.  Is there something you believe in that gives meaning to your life?  Ritu places her danielle in God and believes that he is in heaven protecting her. Much of her actions/behavior is rooted in values/beliefs as a Jehovah Witness.   I: Is it important in your life?  Ritu ranks her Uatsdin as a high priority and is something that takes up a majority of her time. 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 Amish is a guiding force in her life.   What influence does it have on how you take care of yourself?  Ritu noted that self-injury and suicide go against the principles in her Uatsdin, which influences her behavior and serves as a protective factor. She ensures that she puts family and friends on a high priority list.  How have your beliefs influenced your behavior during this illness? What role do your beliefs play in regaining your health?  Ritu's beliefs serve as a protective factor against suicide.  C. Are you part of a spiritual or Methodist community?  Ritu belongs to the [facility] where she practices her Uatsdin.  Is this of support to you and how?   Ritu receives strong support from her peers in the Presybeterian.  Is there a person or group of people you really love or who are really important to you?  Ritu loves her family and friends.  H. We have been discussing your belief and supports. What else gives you internal support?  Ritu believes that she must live in order to support her family.  What are your sources of hope, strength, comfort and peace? []  Ritu has danielle in God which gives her purpose and direction.   What do you hold on to during difficult times? Ritu holds onto her belief in God during difficult times, as well as reading the bible.

## 2025-01-02 NOTE — PHYSICAL EXAM
[None] : none [Well groomed] : well groomed [Cooperative] : cooperative [Euthymic] : euthymic [Full] : full [Clear] : clear [Linear/Goal Directed] : linear/goal directed [Hallucinations - Auditory] : hallucinations - auditory [Average] : average [WNL] : within normal limits [FreeTextEntry8] : "okay" [de-identified] : running communication AH

## 2025-01-03 DIAGNOSIS — F33.3 MAJOR DEPRESSIVE DISORDER, RECURRENT, SEVERE WITH PSYCHOTIC SYMPTOMS: ICD-10-CM

## 2025-01-30 ENCOUNTER — APPOINTMENT (OUTPATIENT)
Dept: PSYCHIATRY | Facility: CLINIC | Age: 65
End: 2025-01-30
Payer: MEDICARE

## 2025-01-30 ENCOUNTER — OUTPATIENT (OUTPATIENT)
Dept: OUTPATIENT SERVICES | Facility: HOSPITAL | Age: 65
LOS: 1 days | End: 2025-01-30
Payer: MEDICARE

## 2025-01-30 DIAGNOSIS — F33.3 MAJOR DEPRESSIVE DISORDER, RECURRENT, SEVERE WITH PSYCHOTIC SYMPTOMS: ICD-10-CM

## 2025-01-30 PROCEDURE — ZZZZZ: CPT

## 2025-01-30 PROCEDURE — 99213 OFFICE O/P EST LOW 20 MIN: CPT

## 2025-01-30 RX ORDER — PAROXETINE HYDROCHLORIDE 10 MG/1
10 TABLET, FILM COATED ORAL DAILY
Qty: 21 | Refills: 0 | Status: ACTIVE | COMMUNITY
Start: 2025-01-30 | End: 1900-01-01

## 2025-01-30 NOTE — PHYSICAL EXAM
Last seen 3/14/2019, please fill or deny [None] : none [Well groomed] : well groomed [Cooperative] : cooperative [Depressed] : depressed [Full] : full [Clear] : clear [Linear/Goal Directed] : linear/goal directed [Hallucinations - Auditory] : hallucinations - auditory [Average] : average [WNL] : within normal limits [FreeTextEntry8] : "down" [de-identified] : running communication AH

## 2025-01-30 NOTE — SOCIAL HISTORY
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 She is able to support herself through disability payments each month.  Developmental hx: iRtu has no history of Intellectual/Developmental Disability.  Social supports - meaningful activities: Ritu enjoys cooking for leisure and spending time with her family. She has strong family support, as well as social support from her Sikhism community.  Race/ethnicity, sexual identity, spirituality/Adventism: Ritu identifies as a Jehova Witness. She values her friends and family, as this is something very important in her culture.  (Spiritual Assessment Tool - BARBARA CHING. What is your danielle or belief?  Ritu is a Jehova Witness and she is actively involved in her Adventism. 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   Do you consider yourself spiritual or Cheondoism?    She considers herself to be highly Cheondoism and she derives strong support from her Cheondoism network.  Is there something you believe in that gives meaning to your life?  Ritu places her danielle in God and believes that he is in heaven protecting her. Much of her actions/behavior is rooted in values/beliefs as a Jehovah Witness.   I: Is it important in your life?  Ritu ranks her Adventism as a high priority and is something that takes up a majority of her time. 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 Denominational is a guiding force in her life.   What influence does it have on how you take care of yourself?  Ritu noted that self-injury and suicide go against the principles in her Adventism, which influences her behavior and serves as a protective factor. She ensures that she puts family and friends on a high priority list.  How have your beliefs influenced your behavior during this illness? What role do your beliefs play in regaining your health?  Ritu's beliefs serve as a protective factor against suicide.  C. Are you part of a spiritual or Cheondoism community?  Ritu belongs to the [employer] [facility] where she practices her Adventism.  Is this of support to you and how?   Ritu receives strong support from her peers in the Sikhism.  Is there a person or group of people you really love or who are really important to you?  Ritu loves her family and friends.  H. We have been discussing your belief and supports. What else gives you internal support?  Ritu believes that she must live in order to support her family.  What are your sources of hope, strength, comfort and peace? []  Ritu has danielle in God which gives her purpose and direction.   What do you hold on to during difficult times? Ritu holds onto her belief in God during difficult times, as well as reading the bible.

## 2025-01-30 NOTE — FAMILY HISTORY
[FreeTextEntry1] : Family Composition, History, and Background: Ritu was born in [place] and moved to [place] in 1995. Her primary language is Dutch. She is unable to speak English fluently. Ritu has 7 sisters that live in [place]. She also has a brother who lives in [place]. Ritu has a strong relationship with all of her siblings, and she can rely on them for social support, especially her sister Radha.  Pertinent Family Medical, MH and Substance Use History including Adult Child of Alcoholic and child of substance abuse status; history of cancer and heart disease:   She mentioned that two of her sisters, one older and one younger, both experience depression. Other than this, she did not report any mental health pathology within her family. Ritu lives with her son; however, she reports that he is a current stressor in her life. Ritu reports that he does not work, and they do not have much contact. She stated that he stays in his room often. She reports that her son, as well as her nephew, engage in substance use. She was unsure of the substances that are used and the intensity/frequency.

## 2025-01-30 NOTE — HISTORY OF PRESENT ILLNESS
[FreeTextEntry1] : Patient seen in person for follow up with sister  The patient is a 63-year-old  female who has past psychiatric history of major depressive disorder with psychotic features who is currently prescribed medication of sertraline 200 mg po daily and haldol 2 mg po daily with adherence to the medications. She has only been taking one tablet of her sertraline, therefore a dose of sertraline 100 mg po daily and continues to have side effect of headache. She states she previously was effectively treated with paxil for many years and would like to transition to paxil. Discussion of risk and benefits was undergone and the patient is amenable to transition from zoloft to paxil. She states she continues to have low mood on sertraline and looks forward to the transition. The patient is amenable to continuing her haldol 2 mg po daily with monitoring as she continues to have intermittent auditory hallucinations. Throughout evaluation, she is cooperative and calm. The aforementioned psychotic symptoms and low mood was elicited. She denies any manic or anxiety symptoms. She denies any suicidal or homicidal ideation, intent, or plan. No visual hallucinations. She has strong familial support through her sister. She is hopeful and future oriented to feel improved.  [Not Applicable] : Not applicable [FreeTextEntry2] : Ritu last received psychiatric treatment in 2018 at [facility]'s outpatient mental health program. She engaged in a few therapy sessions and was prescribed Paxil for her symptoms.  [FreeTextEntry3] : She has trials of paxil with poor effect, risperidone and olanzapine with poor effect, abilify with side effect of nausea, trazodone with poor effect and mirtazapine with poor effect.

## 2025-01-30 NOTE — DISCUSSION/SUMMARY
[FreeTextEntry1] : Patient is a 63 yo f, , domiciled in private residence with her adult son, emigrated from [place] in 1995, currently on disability, pmh of HLD, PPH of depression with psychotic features previously but no previous psychiatric hospitalizations. On evaluation patient reports a history of severe MDD with psychotic features which previously responded to SSRIs and worsened after non adherence to medications. She now presents with ongoing auditory hallucinations that are intermittently derogatory in type. Patient presents for follow up and medication optimization.   Throughout evaluation, she is cooperative and calm. She endorses continued auditory hallucinations and states low mood with side effect of headache from sertraline. she was educated on importance of adherence and questions answered. She denies manic or depressive symptoms. She denies visual hallucinations. She denies suicidal or homicidal ideations, intent, or plan.  Risk factors of MDD with psychotic features is largely mitigated by her help seeking behavior, her previous response to medications, her strong support system, her engagement with Yazidi and her denial of active suicidal or homicidal ideation or intent.   plan: decrease  sertraline to 50 mg po daily for two weeks then initiate paxil 10 mg po daily  c/w haldol 2 mg po daily plan to optimize RTC in 4 weeks  Medication sent to pharmacy for thirty days with no refill

## 2025-01-31 DIAGNOSIS — F33.3 MAJOR DEPRESSIVE DISORDER, RECURRENT, SEVERE WITH PSYCHOTIC SYMPTOMS: ICD-10-CM

## 2025-02-27 ENCOUNTER — OUTPATIENT (OUTPATIENT)
Dept: OUTPATIENT SERVICES | Facility: HOSPITAL | Age: 65
LOS: 1 days | End: 2025-02-27
Payer: MEDICARE

## 2025-02-27 ENCOUNTER — APPOINTMENT (OUTPATIENT)
Dept: PSYCHIATRY | Facility: CLINIC | Age: 65
End: 2025-02-27
Payer: MEDICARE

## 2025-02-27 DIAGNOSIS — F33.3 MAJOR DEPRESSIVE DISORDER, RECURRENT, SEVERE WITH PSYCHOTIC SYMPTOMS: ICD-10-CM

## 2025-02-27 PROCEDURE — ZZZZZ: CPT

## 2025-02-27 PROCEDURE — 99213 OFFICE O/P EST LOW 20 MIN: CPT

## 2025-02-27 RX ORDER — PAROXETINE HYDROCHLORIDE 20 MG/1
20 TABLET, FILM COATED ORAL DAILY
Qty: 32 | Refills: 0 | Status: ACTIVE | COMMUNITY
Start: 2025-02-27 | End: 1900-01-01

## 2025-02-27 NOTE — SOCIAL HISTORY
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 She is able to support herself through disability payments each month.  Developmental hx: Ritu has no history of Intellectual/Developmental Disability.  Social supports - meaningful activities: Ritu enjoys cooking for leisure and spending time with her family. She has strong family support, as well as social support from her Latter day community.  Race/ethnicity, sexual identity, spirituality/Episcopalian: Ritu identifies as a Jehova Witness. She values her friends and family, as this is something very important in her culture.  (Spiritual Assessment Tool - BARBARA CHING. What is your danielle or belief?  Ritu is a Jehova Witness and she is actively involved in her Episcopalian. 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   Do you consider yourself spiritual or Protestant?    She considers herself to be highly Protestant and she derives strong support from her Protestant network.  Is there something you believe in that gives meaning to your life?  Ritu places her danielle in God and believes that he is in heaven protecting her. Much of her actions/behavior is rooted in values/beliefs as a Jehovah Witness.   I: Is it important in your life?  Ritu ranks her Episcopalian as a high priority and is something that takes up a majority of her time. 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 Restorationist is a guiding force in her life.   What influence does it have on how you take care of yourself?  Ritu noted that self-injury and suicide go against the principles in her Episcopalian, which influences her behavior and serves as a protective factor. She ensures that she puts family and friends on a high priority list.  How have your beliefs influenced your behavior during this illness? What role do your beliefs play in regaining your health?  Ritu's beliefs serve as a protective factor against suicide.  C. Are you part of a spiritual or Protestant community?  Ritu belongs to the [employer] [facility] where she practices her Episcopalian.  Is this of support to you and how?   Ritu receives strong support from her peers in the Latter day.  Is there a person or group of people you really love or who are really important to you?  Ritu loves her family and friends.  H. We have been discussing your belief and supports. What else gives you internal support?  Ritu believes that she must live in order to support her family.  What are your sources of hope, strength, comfort and peace? []  Ritu has danielle in God which gives her purpose and direction.   What do you hold on to during difficult times? Rtiu holds onto her belief in God during difficult times, as well as reading the bible.

## 2025-02-27 NOTE — DISCUSSION/SUMMARY
[FreeTextEntry1] : Patient is a 63 yo f, , domiciled in private residence with her adult son, emigrated from [place] in 1995, currently on disability, pmh of HLD, PPH of depression with psychotic features previously but no previous psychiatric hospitalizations. On evaluation patient reports a history of severe MDD with psychotic features which previously responded to SSRIs and worsened after non adherence to medications.  Patient presents for follow up and medication optimization.   Throughout evaluation, she is cooperative and calm. She endorses resolved auditory hallucinations and improvement in mood. she was educated on importance of adherence and questions answered. She denies manic or depressive symptoms. She denies visual hallucinations. She denies suicidal or homicidal ideations, intent, or plan.  Risk factors of MDD with psychotic features is largely mitigated by her help seeking behavior, her previous response to medications, her strong support system, her engagement with Scientologist and her denial of active suicidal or homicidal ideation or intent.   plan: increase paxil to 20 mg po daily  d/c haldol 2 mg po daily plan to optimize RTC in 4 weeks  Medication sent to pharmacy for thirty days with no refill

## 2025-02-27 NOTE — DISCUSSION/SUMMARY
[FreeTextEntry1] : Patient is a 65 yo f, , domiciled in private residence with her adult son, emigrated from [place] in 1995, currently on disability, pmh of HLD, PPH of depression with psychotic features previously but no previous psychiatric hospitalizations. On evaluation patient reports a history of severe MDD with psychotic features which previously responded to SSRIs and worsened after non adherence to medications.  Patient presents for follow up and medication optimization.   Throughout evaluation, she is cooperative and calm. She endorses resolved auditory hallucinations and improvement in mood. she was educated on importance of adherence and questions answered. She denies manic or depressive symptoms. She denies visual hallucinations. She denies suicidal or homicidal ideations, intent, or plan.  Risk factors of MDD with psychotic features is largely mitigated by her help seeking behavior, her previous response to medications, her strong support system, her engagement with Mandaeism and her denial of active suicidal or homicidal ideation or intent.   plan: increase paxil to 20 mg po daily  d/c haldol 2 mg po daily plan to optimize RTC in 4 weeks  Medication sent to pharmacy for thirty days with no refill

## 2025-02-27 NOTE — HISTORY OF PRESENT ILLNESS
[Not Applicable] : Not applicable [FreeTextEntry1] : Patient seen in person for follow up Language Lines  (Urdu) used throughout #[id number]   The patient is a 64-year-old  female who has past psychiatric history of major depressive disorder with psychotic features who is currently prescribed medication of paxil 10 mg po daily and haldol 2 mg po daily with adherence to the medications. She was recently transitioned from sertraline to paxil as she has history of good response years prior. She self discontinued haldol as she feels the medication is not helpful. Over the past month, she has had improvement in mood symptoms , stating less depressed mood, more interest in activities, and increased energy levels. She denies any auditory hallucinations. Throughout evaluation, she is cooperative and calm. The aforementioned improvement in mood symptoms was elicited. She denies any manic, psychotic, or anxiety symptoms. She denies any suicidal or homicidal ideation, intent, or plan. No auditory or visual hallucinations. She has strong familial support through her sister. She is hopeful and future oriented to feel improved.  [FreeTextEntry2] : Ritu last received psychiatric treatment in 2018 at [facility]'s outpatient mental health program. She engaged in a few therapy sessions and was prescribed Paxil for her symptoms.  [FreeTextEntry3] : She has trials of paxil with poor effect, risperidone and olanzapine with poor effect, abilify with side effect of nausea, trazodone with poor effect and mirtazapine with poor effect.

## 2025-02-27 NOTE — HISTORY OF PRESENT ILLNESS
[FreeTextEntry1] : Patient seen in person for follow up Language Lines  (Kyrgyz) used throughout #[id number]   The patient is a 64-year-old  female who has past psychiatric history of major depressive disorder with psychotic features who is currently prescribed medication of paxil 10 mg po daily and haldol 2 mg po daily with adherence to the medications. She was recently transitioned from sertraline to paxil as she has history of good response years prior. She self discontinued haldol as she feels the medication is not helpful. Over the past month, she has had improvement in mood symptoms , stating less depressed mood, more interest in activities, and increased energy levels. She denies any auditory hallucinations. Throughout evaluation, she is cooperative and calm. The aforementioned improvement in mood symptoms was elicited. She denies any manic, psychotic, or anxiety symptoms. She denies any suicidal or homicidal ideation, intent, or plan. No auditory or visual hallucinations. She has strong familial support through her sister. She is hopeful and future oriented to feel improved.  [Not Applicable] : Not applicable [FreeTextEntry2] : Ritu last received psychiatric treatment in 2018 at [facility]'s outpatient mental health program. She engaged in a few therapy sessions and was prescribed Paxil for her symptoms.  [FreeTextEntry3] : She has trials of paxil with poor effect, risperidone and olanzapine with poor effect, abilify with side effect of nausea, trazodone with poor effect and mirtazapine with poor effect.

## 2025-02-27 NOTE — SOCIAL HISTORY
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 She is able to support herself through disability payments each month.  Developmental hx: Ritu has no history of Intellectual/Developmental Disability.  Social supports - meaningful activities: Ritu enjoys cooking for leisure and spending time with her family. She has strong family support, as well as social support from her Restorationist community.  Race/ethnicity, sexual identity, spirituality/Episcopal: Ritu identifies as a Jehova Witness. She values her friends and family, as this is something very important in her culture.  (Spiritual Assessment Tool - BARBARA CHING. What is your danielle or belief?  Ritu is a Jehova Witness and she is actively involved in her Episcopal. 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   Do you consider yourself spiritual or Hinduism?    She considers herself to be highly Hinduism and she derives strong support from her Hinduism network.  Is there something you believe in that gives meaning to your life?  Ritu places her danielle in God and believes that he is in heaven protecting her. Much of her actions/behavior is rooted in values/beliefs as a Jehovah Witness.   I: Is it important in your life?  Ritu ranks her Episcopal as a high priority and is something that takes up a majority of her time. 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 Episcopalian is a guiding force in her life.   What influence does it have on how you take care of yourself?  Ritu noted that self-injury and suicide go against the principles in her Episcopal, which influences her behavior and serves as a protective factor. She ensures that she puts family and friends on a high priority list.  How have your beliefs influenced your behavior during this illness? What role do your beliefs play in regaining your health?  Ritu's beliefs serve as a protective factor against suicide.  C. Are you part of a spiritual or Hinduism community?  Ritu belongs to the [employer] [facility] where she practices her Episcopal.  Is this of support to you and how?   Ritu receives strong support from her peers in the Restorationist.  Is there a person or group of people you really love or who are really important to you?  Riut loves her family and friends.  H. We have been discussing your belief and supports. What else gives you internal support?  Ritu believes that she must live in order to support her family.  What are your sources of hope, strength, comfort and peace? []  Ritu has danielle in God which gives her purpose and direction.   What do you hold on to during difficult times? Ritu holds onto her belief in God during difficult times, as well as reading the bible.

## 2025-02-27 NOTE — FAMILY HISTORY
[FreeTextEntry1] : Family Composition, History, and Background: Ritu was born in [place] and moved to [place] in 1995. Her primary language is Botswanan. She is unable to speak English fluently. Ritu has 7 sisters that live in [place]. She also has a brother who lives in [place]. Ritu has a strong relationship with all of her siblings, and she can rely on them for social support, especially her sister Radha.  Pertinent Family Medical, MH and Substance Use History including Adult Child of Alcoholic and child of substance abuse status; history of cancer and heart disease:   She mentioned that two of her sisters, one older and one younger, both experience depression. Other than this, she did not report any mental health pathology within her family. Ritu lives with her son; however, she reports that he is a current stressor in her life. Ritu reports that he does not work, and they do not have much contact. She stated that he stays in his room often. She reports that her son, as well as her nephew, engage in substance use. She was unsure of the substances that are used and the intensity/frequency.

## 2025-02-27 NOTE — FAMILY HISTORY
[FreeTextEntry1] : Family Composition, History, and Background: Ritu was born in [place] and moved to [place] in 1995. Her primary language is Indonesian. She is unable to speak English fluently. Ritu has 7 sisters that live in [place]. She also has a brother who lives in [place]. Ritu has a strong relationship with all of her siblings, and she can rely on them for social support, especially her sister Radha.  Pertinent Family Medical, MH and Substance Use History including Adult Child of Alcoholic and child of substance abuse status; history of cancer and heart disease:   She mentioned that two of her sisters, one older and one younger, both experience depression. Other than this, she did not report any mental health pathology within her family. Ritu lives with her son; however, she reports that he is a current stressor in her life. Ritu reports that he does not work, and they do not have much contact. She stated that he stays in his room often. She reports that her son, as well as her nephew, engage in substance use. She was unsure of the substances that are used and the intensity/frequency.

## 2025-02-27 NOTE — PHYSICAL EXAM
[None] : none [Well groomed] : well groomed [Cooperative] : cooperative [Euthymic] : euthymic [Full] : full [Clear] : clear [Linear/Goal Directed] : linear/goal directed [Average] : average [WNL] : within normal limits [FreeTextEntry8] : "better"

## 2025-02-28 DIAGNOSIS — F33.3 MAJOR DEPRESSIVE DISORDER, RECURRENT, SEVERE WITH PSYCHOTIC SYMPTOMS: ICD-10-CM

## 2025-04-02 ENCOUNTER — OUTPATIENT (OUTPATIENT)
Dept: OUTPATIENT SERVICES | Facility: HOSPITAL | Age: 65
LOS: 1 days | End: 2025-04-02
Payer: MEDICARE

## 2025-04-02 ENCOUNTER — NON-APPOINTMENT (OUTPATIENT)
Age: 65
End: 2025-04-02

## 2025-04-02 ENCOUNTER — APPOINTMENT (OUTPATIENT)
Dept: PSYCHIATRY | Facility: CLINIC | Age: 65
End: 2025-04-02
Payer: MEDICARE

## 2025-04-02 DIAGNOSIS — F33.3 MAJOR DEPRESSIVE DISORDER, RECURRENT, SEVERE WITH PSYCHOTIC SYMPTOMS: ICD-10-CM

## 2025-04-02 PROCEDURE — 98006 SYNCH AUDIO-VIDEO EST MOD 30: CPT

## 2025-04-02 PROCEDURE — ZZZZZ: CPT

## 2025-04-02 NOTE — SOCIAL HISTORY
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 She is able to support herself through disability payments each month.  Developmental hx: Ritu has no history of Intellectual/Developmental Disability.  Social supports - meaningful activities: Ritu enjoys cooking for leisure and spending time with her family. She has strong family support, as well as social support from her Congregational community.  Race/ethnicity, sexual identity, spirituality/Yazdanism: Ritu identifies as a Jehova Witness. She values her friends and family, as this is something very important in her culture.  (Spiritual Assessment Tool - BARBARA CHING. What is your danielle or belief?  Ritu is a Jehova Witness and she is actively involved in her Yazdanism. 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   Do you consider yourself spiritual or Catholic?    She considers herself to be highly Catholic and she derives strong support from her Catholic network.  Is there something you believe in that gives meaning to your life?  Ritu places her danielle in God and believes that he is in heaven protecting her. Much of her actions/behavior is rooted in values/beliefs as a Jehovah Witness.   I: Is it important in your life?  Ritu ranks her Yazdanism as a high priority and is something that takes up a majority of her time. 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 Mandaeism is a guiding force in her life.   What influence does it have on how you take care of yourself?  Ritu noted that self-injury and suicide go against the principles in her Yazdanism, which influences her behavior and serves as a protective factor. She ensures that she puts family and friends on a high priority list.  How have your beliefs influenced your behavior during this illness? What role do your beliefs play in regaining your health?  Ritu's beliefs serve as a protective factor against suicide.  C. Are you part of a spiritual or Catholic community?  Ritu belongs to the [employer] [facility] where she practices her Yazdanism.  Is this of support to you and how?   Ritu receives strong support from her peers in the Congregational.  Is there a person or group of people you really love or who are really important to you?  Ritu loves her family and friends.  H. We have been discussing your belief and supports. What else gives you internal support?  Ritu believes that she must live in order to support her family.  What are your sources of hope, strength, comfort and peace? []  Ritu has danielle in God which gives her purpose and direction.   What do you hold on to during difficult times? Ritu holds onto her belief in God during difficult times, as well as reading the bible.

## 2025-04-02 NOTE — HISTORY OF PRESENT ILLNESS
[FreeTextEntry1] : Patient seen via telehealth for follow up Language Lines  (Moroccan) used throughout #[id number]  The patient is a 64-year-old  female who has past psychiatric history of major depressive disorder with psychotic features who is currently prescribed medication of paxil 20 mg po daily with adherence to the medications. She is sleeping well throughout the night, caring for hygiene, and her appetite is good. She has continued improvement in mood, feeling less isolative, having increased interest to spend time with family, and being more hopeful. She states improvement in energy as well. She denies any auditory hallucinations. Throughout evaluation, she is cooperative and calm. She denies any depressive, anxiety, manic, psychotic, or anxiety symptoms. She denies any suicidal or homicidal ideation, intent, or plan. No auditory or visual hallucinations. She has strong familial support through her sister. She is hopeful and future oriented to feel improved.  [Not Applicable] : Not applicable [FreeTextEntry2] : Ritu last received psychiatric treatment in 2018 at [facility]'s outpatient mental health program. She engaged in a few therapy sessions and was prescribed Paxil for her symptoms.  [FreeTextEntry3] : She has trials of paxil with poor effect, risperidone and olanzapine with poor effect, abilify with side effect of nausea, trazodone with poor effect and mirtazapine with poor effect.

## 2025-04-02 NOTE — PHYSICAL EXAM
[None] : none [Well groomed] : well groomed [Cooperative] : cooperative [Euthymic] : euthymic [Full] : full [Clear] : clear [Linear/Goal Directed] : linear/goal directed [Average] : average [WNL] : within normal limits [FreeTextEntry8] : "good"

## 2025-04-02 NOTE — FAMILY HISTORY
[FreeTextEntry1] : Family Composition, History, and Background: Ritu was born in [place] and moved to [place] in 1995. Her primary language is Danish. She is unable to speak English fluently. Ritu has 7 sisters that live in [place]. She also has a brother who lives in [place]. Ritu has a strong relationship with all of her siblings, and she can rely on them for social support, especially her sister Radha.  Pertinent Family Medical, MH and Substance Use History including Adult Child of Alcoholic and child of substance abuse status; history of cancer and heart disease:   She mentioned that two of her sisters, one older and one younger, both experience depression. Other than this, she did not report any mental health pathology within her family. Ritu lives with her son; however, she reports that he is a current stressor in her life. Ritu reports that he does not work, and they do not have much contact. She stated that he stays in his room often. She reports that her son, as well as her nephew, engage in substance use. She was unsure of the substances that are used and the intensity/frequency.

## 2025-04-02 NOTE — DISCUSSION/SUMMARY
[FreeTextEntry1] : Patient is a 65 yo f, , domiciled in private residence with her adult son, emigrated from [place] in 1995, currently on disability, pmh of HLD, PPH of depression with psychotic features previously but no previous psychiatric hospitalizations. On evaluation patient reports a history of severe MDD with psychotic features which previously responded to SSRIs and worsened after non adherence to medications.  Patient presents for follow up and medication optimization.   Throughout evaluation, she is cooperative and calm. She denies depressive, anxiety, manic or depressive symptoms. She denies visual hallucinations. She denies suicidal or homicidal ideations, intent, or plan.  Risk factors of MDD with psychotic features is largely mitigated by her help seeking behavior, her previous response to medications, her strong support system, her engagement with Hoahaoism and her denial of active suicidal or homicidal ideation or intent.   plan: c/w paxil  20 mg po daily  RTC in 4 weeks  Medication sent to pharmacy for thirty days with no refill

## 2025-04-03 DIAGNOSIS — F33.3 MAJOR DEPRESSIVE DISORDER, RECURRENT, SEVERE WITH PSYCHOTIC SYMPTOMS: ICD-10-CM

## 2025-04-03 NOTE — PHYSICAL EXAM
[None] : none [Cooperative] : cooperative [Clear] : clear [Linear/Goal Directed] : linear/goal directed [Hallucinations - Auditory] : hallucinations - auditory [Average] : average [WNL] : within normal limits [Constricted] : constricted [FreeTextEntry1] : well dressed and groomed [FreeTextEntry8] : "better" [de-identified] : Euthymic [de-identified] : denied active, reports continued intermittent AH  used

## 2025-04-22 ENCOUNTER — APPOINTMENT (OUTPATIENT)
Dept: PSYCHIATRY | Facility: CLINIC | Age: 65
End: 2025-04-22

## 2025-05-01 ENCOUNTER — OUTPATIENT (OUTPATIENT)
Dept: OUTPATIENT SERVICES | Facility: HOSPITAL | Age: 65
LOS: 1 days | End: 2025-05-01
Payer: MEDICARE

## 2025-05-01 ENCOUNTER — APPOINTMENT (OUTPATIENT)
Dept: PSYCHIATRY | Facility: CLINIC | Age: 65
End: 2025-05-01
Payer: MEDICARE

## 2025-05-01 DIAGNOSIS — F33.3 MAJOR DEPRESSIVE DISORDER, RECURRENT, SEVERE WITH PSYCHOTIC SYMPTOMS: ICD-10-CM

## 2025-05-01 DIAGNOSIS — F41.1 GENERALIZED ANXIETY DISORDER: ICD-10-CM

## 2025-05-01 PROCEDURE — ZZZZZ: CPT

## 2025-05-01 PROCEDURE — 99213 OFFICE O/P EST LOW 20 MIN: CPT

## 2025-05-01 NOTE — PHYSICAL EXAM
[None] : none [Well groomed] : well groomed [Cooperative] : cooperative [Euthymic] : euthymic [Full] : full [Clear] : clear [Linear/Goal Directed] : linear/goal directed [Average] : average [WNL] : within normal limits [FreeTextEntry8] : "well"

## 2025-05-01 NOTE — SOCIAL HISTORY
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 She is able to support herself through disability payments each month.  Developmental hx: Ritu has no history of Intellectual/Developmental Disability.  Social supports - meaningful activities: Ritu enjoys cooking for leisure and spending time with her family. She has strong family support, as well as social support from her Hinduism community.  Race/ethnicity, sexual identity, spirituality/Samaritan: Ritu identifies as a Jehova Witness. She values her friends and family, as this is something very important in her culture.  (Spiritual Assessment Tool - BARBARA CHING. What is your danielle or belief?  Ritu is a Jehova Witness and she is actively involved in her Samaritan. 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   Do you consider yourself spiritual or Rastafari?    She considers herself to be highly Rastafari and she derives strong support from her Rastafari network.  Is there something you believe in that gives meaning to your life?  Ritu places her danielle in God and believes that he is in heaven protecting her. Much of her actions/behavior is rooted in values/beliefs as a Jehovah Witness.   I: Is it important in your life?  Ritu ranks her Samaritan as a high priority and is something that takes up a majority of her time. 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 Lutheran is a guiding force in her life.   What influence does it have on how you take care of yourself?  Ritu noted that self-injury and suicide go against the principles in her Samaritan, which influences her behavior and serves as a protective factor. She ensures that she puts family and friends on a high priority list.  How have your beliefs influenced your behavior during this illness? What role do your beliefs play in regaining your health?  Ritu's beliefs serve as a protective factor against suicide.  C. Are you part of a spiritual or Rastafari community?  Ritu belongs to the [employer] [facility] where she practices her Samaritan.  Is this of support to you and how?   Ritu receives strong support from her peers in the Hinduism.  Is there a person or group of people you really love or who are really important to you?  Ritu loves her family and friends.  H. We have been discussing your belief and supports. What else gives you internal support?  Ritu believes that she must live in order to support her family.  What are your sources of hope, strength, comfort and peace? []  Ritu has danielle in God which gives her purpose and direction.   What do you hold on to during difficult times? Ritu holds onto her belief in God during difficult times, as well as reading the bible.

## 2025-05-01 NOTE — FAMILY HISTORY
[FreeTextEntry1] : Family Composition, History, and Background: Ritu was born in [place] and moved to [place] in 1995. Her primary language is Wallisian. She is unable to speak English fluently. Ritu has 7 sisters that live in [place]. She also has a brother who lives in [place]. Ritu has a strong relationship with all of her siblings, and she can rely on them for social support, especially her sister Radha.  Pertinent Family Medical, MH and Substance Use History including Adult Child of Alcoholic and child of substance abuse status; history of cancer and heart disease:   She mentioned that two of her sisters, one older and one younger, both experience depression. Other than this, she did not report any mental health pathology within her family. Ritu lives with her son; however, she reports that he is a current stressor in her life. Ritu reports that he does not work, and they do not have much contact. She stated that he stays in his room often. She reports that her son, as well as her nephew, engage in substance use. She was unsure of the substances that are used and the intensity/frequency.

## 2025-05-01 NOTE — HISTORY OF PRESENT ILLNESS
[FreeTextEntry1] : Patient seen in person for follow up  The patient is a 64-year-old  female who has past psychiatric history of major depressive disorder with psychotic features who is currently prescribed medication of paxil 20 mg po daily with adherence to the medications. She continues to have stable mood and denies acute psychiatric concerns. She is sleeping well throughout the night, caring for hygiene, and her appetite is good.  She is spending time with friends and family as well as enjoying going for walks. She denies any auditory hallucinations. Throughout evaluation, she is cooperative and calm. She denies any depressive, anxiety, manic, psychotic, or anxiety symptoms. She denies any suicidal or homicidal ideation, intent, or plan. No auditory or visual hallucinations. She has strong familial support through her sister. She is hopeful and future oriented to feel improved.  [Not Applicable] : Not applicable [FreeTextEntry2] : Ritu last received psychiatric treatment in 2018 at [facility]'s outpatient mental health program. She engaged in a few therapy sessions and was prescribed Paxil for her symptoms.  [FreeTextEntry3] : She has trials of paxil with poor effect, risperidone and olanzapine with poor effect, abilify with side effect of nausea, trazodone with poor effect and mirtazapine with poor effect.

## 2025-05-01 NOTE — DISCUSSION/SUMMARY
[FreeTextEntry1] : Patient is a 63 yo f, , domiciled in private residence with her adult son, emigrated from [place] in 1995, currently on disability, pmh of HLD, PPH of depression with psychotic features previously but no previous psychiatric hospitalizations. On evaluation patient reports a history of severe MDD with psychotic features which previously responded to SSRIs and worsened after non adherence to medications.  Patient presents for follow up and medication optimization.   Throughout evaluation, she is cooperative and calm. She denies depressive, anxiety, manic or depressive symptoms. She denies visual hallucinations. She denies suicidal or homicidal ideations, intent, or plan.  Risk factors of MDD with psychotic features is largely mitigated by her help seeking behavior, her previous response to medications, her strong support system, her engagement with Qu[employer] and her denial of active suicidal or homicidal ideation or intent.   plan: c/w paxil  20 mg po daily  RTC in 4 weeks  Medication sent to pharmacy for thirty days with no refill

## 2025-05-02 DIAGNOSIS — F33.3 MAJOR DEPRESSIVE DISORDER, RECURRENT, SEVERE WITH PSYCHOTIC SYMPTOMS: ICD-10-CM

## 2025-05-29 ENCOUNTER — APPOINTMENT (OUTPATIENT)
Dept: PSYCHIATRY | Facility: CLINIC | Age: 65
End: 2025-05-29
Payer: MEDICARE

## 2025-05-29 ENCOUNTER — APPOINTMENT (OUTPATIENT)
Dept: INTERVENTIONAL RADIOLOGY/VASCULAR | Facility: CLINIC | Age: 65
End: 2025-05-29

## 2025-05-29 ENCOUNTER — OUTPATIENT (OUTPATIENT)
Dept: OUTPATIENT SERVICES | Facility: HOSPITAL | Age: 65
LOS: 1 days | End: 2025-05-29
Payer: MEDICARE

## 2025-05-29 VITALS
TEMPERATURE: 97.1 F | HEART RATE: 84 BPM | OXYGEN SATURATION: 96 % | DIASTOLIC BLOOD PRESSURE: 76 MMHG | SYSTOLIC BLOOD PRESSURE: 117 MMHG

## 2025-05-29 DIAGNOSIS — F33.3 MAJOR DEPRESSIVE DISORDER, RECURRENT, SEVERE WITH PSYCHOTIC SYMPTOMS: ICD-10-CM

## 2025-05-29 PROCEDURE — 99213 OFFICE O/P EST LOW 20 MIN: CPT

## 2025-05-29 PROCEDURE — ZZZZZ: CPT

## 2025-05-29 NOTE — DISCUSSION/SUMMARY
[FreeTextEntry1] : Patient is a 65 yo f, , domiciled in private residence with her adult son, emigrated from [place] in 1995, currently on disability, pmh of HLD, PPH of depression with psychotic features previously but no previous psychiatric hospitalizations. On evaluation patient reports a history of severe MDD with psychotic features which previously responded to SSRIs and worsened after non adherence to medications. She is currently stabilized on paxil 20 mg po daily.  Patient presents for follow up and medication optimization.   Throughout evaluation, she is cooperative and calm. She denies depressive, anxiety, manic or depressive symptoms. She denies visual hallucinations. She denies suicidal or homicidal ideations, intent, or plan.  Risk factors of MDD with psychotic features is largely mitigated by her help seeking behavior, her previous response to medications, her strong support system, her engagement with Buddhism and her denial of active suicidal or homicidal ideation or intent.   plan: transition of care discussed c/w paxil  20 mg po daily  RTC in 4 weeks  Medication sent to pharmacy for thirty days with no refill

## 2025-05-29 NOTE — HISTORY OF PRESENT ILLNESS
[FreeTextEntry1] : Patient seen in person for follow up  The patient is a 64-year-old  female who has past psychiatric history of major depressive disorder with psychotic features who is currently prescribed medication of paxil 20 mg po daily with adherence to the medications. She denies any side effects with the medication. She is sleeping well throughout the night, caring for hygiene, and her appetite is good. She continues to have stable mood and is doing well with good support from family. She is spending time with friends and family as well as enjoying going for walks. She denies any auditory hallucinations. Throughout evaluation, she is cooperative and calm. She denies any depressive, anxiety, manic, psychotic, or anxiety symptoms. She denies any suicidal or homicidal ideation, intent, or plan. No auditory or visual hallucinations. She has strong familial support through her sister. She is hopeful and future oriented to feel improved.  [Not Applicable] : Not applicable [FreeTextEntry2] : Ritu last received psychiatric treatment in 2018 at [facility] outpatient mental health program. She engaged in a few therapy sessions and was prescribed Paxil for her symptoms.  [FreeTextEntry3] : She has trials of paxil with poor effect, risperidone and olanzapine with poor effect, abilify with side effect of nausea, trazodone with poor effect and mirtazapine with poor effect.

## 2025-05-29 NOTE — SOCIAL HISTORY
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 She is able to support herself through disability payments each month.  Developmental hx: Ritu has no history of Intellectual/Developmental Disability.  Social supports - meaningful activities: Ritu enjoys cooking for leisure and spending time with her family. She has strong family support, as well as social support from her Religious community.  Race/ethnicity, sexual identity, spirituality/Yarsani: Ritu identifies as a Jehova Witness. She values her friends and family, as this is something very important in her culture.  (Spiritual Assessment Tool - BARBARA CHING. What is your danielle or belief?  Ritu is a Jehova Witness and she is actively involved in her Yarsani. 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   Do you consider yourself spiritual or Judaism?    She considers herself to be highly Judaism and she derives strong support from her Judaism network.  Is there something you believe in that gives meaning to your life?  Ritu places her danielle in God and believes that he is in heaven protecting her. Much of her actions/behavior is rooted in values/beliefs as a Jehovah Witness.   I: Is it important in your life?  Ritu ranks her Yarsani as a high priority and is something that takes up a majority of her time. 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 Yazidi is a guiding force in her life.   What influence does it have on how you take care of yourself?  Ritu noted that self-injury and suicide go against the principles in her Yarsani, which influences her behavior and serves as a protective factor. She ensures that she puts family and friends on a high priority list.  How have your beliefs influenced your behavior during this illness? What role do your beliefs play in regaining your health?  Ritu's beliefs serve as a protective factor against suicide.  C. Are you part of a spiritual or Judaism community?  Ritu belongs to the [id number] [facility] where she practices her Yarsani.  Is this of support to you and how?   Ritu receives strong support from her peers in the Religious.  Is there a person or group of people you really love or who are really important to you?  Ritu loves her family and friends.  H. We have been discussing your belief and supports. What else gives you internal support?  Ritu believes that she must live in order to support her family.  What are your sources of hope, strength, comfort and peace? []  Ritu has danielle in God which gives her purpose and direction.   What do you hold on to during difficult times? Ritu holds onto her belief in God during difficult times, as well as reading the bible.

## 2025-05-29 NOTE — FAMILY HISTORY
[FreeTextEntry1] : Family Composition, History, and Background: Ritu was born in [place] and moved to [place] in 1995. Her primary language is Nepalese. She is unable to speak English fluently. Ritu has 7 sisters that live in [place]. She also has a brother who lives in [place]. Ritu has a strong relationship with all of her siblings, and she can rely on them for social support, especially her sister Radha.  Pertinent Family Medical, MH and Substance Use History including Adult Child of Alcoholic and child of substance abuse status; history of cancer and heart disease:   She mentioned that two of her sisters, one older and one younger, both experience depression. Other than this, she did not report any mental health pathology within her family. Ritu lives with her son; however, she reports that he is a current stressor in her life. Ritu reports that he does not work, and they do not have much contact. She stated that he stays in his room often. She reports that her son, as well as her nephew, engage in substance use. She was unsure of the substances that are used and the intensity/frequency.

## 2025-05-30 DIAGNOSIS — F33.3 MAJOR DEPRESSIVE DISORDER, RECURRENT, SEVERE WITH PSYCHOTIC SYMPTOMS: ICD-10-CM

## 2025-06-30 ENCOUNTER — NON-APPOINTMENT (OUTPATIENT)
Age: 65
End: 2025-06-30

## 2025-07-18 ENCOUNTER — OUTPATIENT (OUTPATIENT)
Dept: OUTPATIENT SERVICES | Facility: HOSPITAL | Age: 65
LOS: 1 days | End: 2025-07-18
Payer: MEDICARE

## 2025-07-18 ENCOUNTER — APPOINTMENT (OUTPATIENT)
Dept: OPHTHALMOLOGY | Facility: CLINIC | Age: 65
End: 2025-07-18
Payer: MEDICARE

## 2025-07-18 DIAGNOSIS — H04.123 DRY EYE SYNDROME OF BILATERAL LACRIMAL GLANDS: ICD-10-CM

## 2025-07-18 DIAGNOSIS — H01.00B UNSPECIFIED BLEPHARITIS LEFT EYE, UPPER AND LOWER EYELIDS: ICD-10-CM

## 2025-07-18 DIAGNOSIS — H02.88A MEIBOMIAN GLAND DYSFUNCTION RIGHT EYE, UPPER AND LOWER EYELIDS: ICD-10-CM

## 2025-07-18 DIAGNOSIS — H53.8 OTHER VISUAL DISTURBANCES: ICD-10-CM

## 2025-07-18 DIAGNOSIS — H02.88B MEIBOMIAN GLAND DYSFUNCTION LEFT EYE, UPPER AND LOWER EYELIDS: ICD-10-CM

## 2025-07-18 DIAGNOSIS — H35.013 CHANGES IN RETINAL VASCULAR APPEARANCE, BILATERAL: ICD-10-CM

## 2025-07-18 DIAGNOSIS — H01.00A UNSPECIFIED BLEPHARITIS RIGHT EYE, UPPER AND LOWER EYELIDS: ICD-10-CM

## 2025-07-18 PROCEDURE — 92134 CPTRZ OPH DX IMG PST SGM RTA: CPT | Mod: 26

## 2025-07-18 PROCEDURE — 92012 INTRM OPH EXAM EST PATIENT: CPT

## 2025-08-08 ENCOUNTER — APPOINTMENT (OUTPATIENT)
Dept: PSYCHIATRY | Facility: CLINIC | Age: 65
End: 2025-08-08
Payer: MEDICARE

## 2025-08-08 ENCOUNTER — OUTPATIENT (OUTPATIENT)
Dept: OUTPATIENT SERVICES | Facility: HOSPITAL | Age: 65
LOS: 1 days | End: 2025-08-08
Payer: MEDICARE

## 2025-08-08 DIAGNOSIS — F41.1 GENERALIZED ANXIETY DISORDER: ICD-10-CM

## 2025-08-08 DIAGNOSIS — F33.9 MAJOR DEPRESSIVE DISORDER, RECURRENT, UNSPECIFIED: ICD-10-CM

## 2025-08-08 PROCEDURE — 99214 OFFICE O/P EST MOD 30 MIN: CPT

## 2025-08-08 PROCEDURE — ZZZZZ: CPT

## 2025-08-08 RX ORDER — QUETIAPINE FUMARATE 50 MG/1
50 TABLET ORAL
Qty: 45 | Refills: 0 | Status: ACTIVE | COMMUNITY
Start: 2025-08-08 | End: 2025-09-22

## 2025-08-09 DIAGNOSIS — F33.9 MAJOR DEPRESSIVE DISORDER, RECURRENT, UNSPECIFIED: ICD-10-CM

## 2025-08-15 ENCOUNTER — EMERGENCY (EMERGENCY)
Facility: HOSPITAL | Age: 65
LOS: 0 days | Discharge: HOME | End: 2025-08-15
Attending: EMERGENCY MEDICINE
Payer: MEDICARE

## 2025-08-15 VITALS
WEIGHT: 158.95 LBS | HEIGHT: 63 IN | OXYGEN SATURATION: 98 % | DIASTOLIC BLOOD PRESSURE: 85 MMHG | TEMPERATURE: 98 F | SYSTOLIC BLOOD PRESSURE: 126 MMHG | HEART RATE: 68 BPM | RESPIRATION RATE: 18 BRPM
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
## 2025-08-15 DIAGNOSIS — S61.411A LACERATION WITHOUT FOREIGN BODY OF RIGHT HAND, INITIAL ENCOUNTER: ICD-10-CM

## 2025-08-15 DIAGNOSIS — Z88.7 ALLERGY STATUS TO SERUM AND VACCINE: ICD-10-CM
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
## 2025-08-15 PROCEDURE — 99282 EMERGENCY DEPT VISIT SF MDM: CPT | Mod: 25

## 2025-08-15 PROCEDURE — 12001 RPR S/N/AX/GEN/TRNK 2.5CM/<: CPT

## 2025-08-15 PROCEDURE — 99284 EMERGENCY DEPT VISIT MOD MDM: CPT | Mod: 25

## 2025-08-29 ENCOUNTER — APPOINTMENT (OUTPATIENT)
Dept: OBGYN | Facility: CLINIC | Age: 65
End: 2025-08-29

## 2025-09-18 ENCOUNTER — APPOINTMENT (OUTPATIENT)
Dept: PSYCHIATRY | Facility: CLINIC | Age: 65
End: 2025-09-18